# Patient Record
Sex: MALE | Race: WHITE | Employment: OTHER | URBAN - METROPOLITAN AREA
[De-identification: names, ages, dates, MRNs, and addresses within clinical notes are randomized per-mention and may not be internally consistent; named-entity substitution may affect disease eponyms.]

---

## 2022-01-25 ENCOUNTER — HOSPITAL ENCOUNTER (INPATIENT)
Age: 37
LOS: 5 days | Discharge: HOME | DRG: 885 | End: 2022-01-31
Attending: STUDENT IN AN ORGANIZED HEALTH CARE EDUCATION/TRAINING PROGRAM
Payer: MEDICARE

## 2022-01-25 DIAGNOSIS — R45.851 SUICIDAL IDEATION: Primary | ICD-10-CM

## 2022-01-25 PROCEDURE — 83036 HEMOGLOBIN GLYCOSYLATED A1C: CPT

## 2022-01-25 PROCEDURE — 80307 DRUG TEST PRSMV CHEM ANLYZR: CPT

## 2022-01-25 PROCEDURE — 80061 LIPID PANEL: CPT

## 2022-01-25 PROCEDURE — 85025 COMPLETE CBC W/AUTO DIFF WBC: CPT

## 2022-01-25 PROCEDURE — 84443 ASSAY THYROID STIM HORMONE: CPT

## 2022-01-25 PROCEDURE — 36415 COLL VENOUS BLD VENIPUNCTURE: CPT

## 2022-01-25 PROCEDURE — 99285 EMERGENCY DEPT VISIT HI MDM: CPT

## 2022-01-25 PROCEDURE — 81003 URINALYSIS AUTO W/O SCOPE: CPT

## 2022-01-25 PROCEDURE — 80053 COMPREHEN METABOLIC PANEL: CPT

## 2022-01-25 PROCEDURE — 80143 DRUG ASSAY ACETAMINOPHEN: CPT

## 2022-01-25 PROCEDURE — 82077 ASSAY SPEC XCP UR&BREATH IA: CPT

## 2022-01-25 PROCEDURE — 80179 DRUG ASSAY SALICYLATE: CPT

## 2022-01-25 ASSESSMENT — PAIN SCALES - GENERAL: PAINLEVEL_OUTOF10: 6

## 2022-01-25 ASSESSMENT — PAIN DESCRIPTION - LOCATION: LOCATION: TOE (COMMENT WHICH ONE)

## 2022-01-25 ASSESSMENT — PAIN DESCRIPTION - PAIN TYPE: TYPE: ACUTE PAIN

## 2022-01-25 ASSESSMENT — PAIN DESCRIPTION - ORIENTATION: ORIENTATION: RIGHT

## 2022-01-26 ENCOUNTER — APPOINTMENT (OUTPATIENT)
Dept: GENERAL RADIOLOGY | Age: 37
DRG: 885 | End: 2022-01-26
Payer: MEDICARE

## 2022-01-26 PROBLEM — F31.2 BIPOLAR DISORDER, CURRENT EPISODE MANIC SEVERE WITH PSYCHOTIC FEATURES (HCC): Status: ACTIVE | Noted: 2022-01-26

## 2022-01-26 PROBLEM — F60.2 PERSONALITY DISORDER WITH PREDOMINANTLY SOCIOPATHIC OR ASOCIAL MANIFESTATION (HCC): Status: ACTIVE | Noted: 2022-01-26

## 2022-01-26 LAB
A/G RATIO: 1.5 (ref 1.1–2.2)
ACETAMINOPHEN LEVEL: <5 UG/ML (ref 10–30)
ALBUMIN SERPL-MCNC: 4.4 G/DL (ref 3.4–5)
ALP BLD-CCNC: 96 U/L (ref 40–129)
ALT SERPL-CCNC: 26 U/L (ref 10–40)
AMPHETAMINE SCREEN, URINE: ABNORMAL
ANION GAP SERPL CALCULATED.3IONS-SCNC: 12 MMOL/L (ref 3–16)
AST SERPL-CCNC: 17 U/L (ref 15–37)
BARBITURATE SCREEN URINE: ABNORMAL
BASOPHILS ABSOLUTE: 0.1 K/UL (ref 0–0.2)
BASOPHILS RELATIVE PERCENT: 0.6 %
BENZODIAZEPINE SCREEN, URINE: ABNORMAL
BILIRUB SERPL-MCNC: <0.2 MG/DL (ref 0–1)
BILIRUBIN URINE: NEGATIVE
BLOOD, URINE: NEGATIVE
BUN BLDV-MCNC: 15 MG/DL (ref 7–20)
CALCIUM SERPL-MCNC: 9.5 MG/DL (ref 8.3–10.6)
CANNABINOID SCREEN URINE: POSITIVE
CHLORIDE BLD-SCNC: 104 MMOL/L (ref 99–110)
CLARITY: CLEAR
CO2: 20 MMOL/L (ref 21–32)
COCAINE METABOLITE SCREEN URINE: ABNORMAL
COLOR: YELLOW
CREAT SERPL-MCNC: 0.8 MG/DL (ref 0.9–1.3)
EOSINOPHILS ABSOLUTE: 0.3 K/UL (ref 0–0.6)
EOSINOPHILS RELATIVE PERCENT: 2.3 %
ETHANOL: NORMAL MG/DL (ref 0–0.08)
GFR AFRICAN AMERICAN: >60
GFR NON-AFRICAN AMERICAN: >60
GLUCOSE BLD-MCNC: 149 MG/DL (ref 70–99)
GLUCOSE URINE: NEGATIVE MG/DL
HCT VFR BLD CALC: 44.7 % (ref 40.5–52.5)
HEMOGLOBIN: 15.4 G/DL (ref 13.5–17.5)
INFLUENZA A: NOT DETECTED
INFLUENZA B: NOT DETECTED
KETONES, URINE: NEGATIVE MG/DL
LEUKOCYTE ESTERASE, URINE: NEGATIVE
LYMPHOCYTES ABSOLUTE: 4.2 K/UL (ref 1–5.1)
LYMPHOCYTES RELATIVE PERCENT: 35.3 %
Lab: ABNORMAL
MCH RBC QN AUTO: 32.5 PG (ref 26–34)
MCHC RBC AUTO-ENTMCNC: 34.4 G/DL (ref 31–36)
MCV RBC AUTO: 94.4 FL (ref 80–100)
METHADONE SCREEN, URINE: ABNORMAL
MICROSCOPIC EXAMINATION: NORMAL
MONOCYTES ABSOLUTE: 1.2 K/UL (ref 0–1.3)
MONOCYTES RELATIVE PERCENT: 10.2 %
NEUTROPHILS ABSOLUTE: 6.2 K/UL (ref 1.7–7.7)
NEUTROPHILS RELATIVE PERCENT: 51.6 %
NITRITE, URINE: NEGATIVE
OPIATE SCREEN URINE: ABNORMAL
OXYCODONE URINE: ABNORMAL
PDW BLD-RTO: 13.8 % (ref 12.4–15.4)
PH UA: 6
PH UA: 6 (ref 5–8)
PHENCYCLIDINE SCREEN URINE: ABNORMAL
PLATELET # BLD: 302 K/UL (ref 135–450)
PMV BLD AUTO: 8.4 FL (ref 5–10.5)
POTASSIUM SERPL-SCNC: 3.8 MMOL/L (ref 3.5–5.1)
PROPOXYPHENE SCREEN: ABNORMAL
PROTEIN UA: NEGATIVE MG/DL
RBC # BLD: 4.74 M/UL (ref 4.2–5.9)
SALICYLATE, SERUM: <0.3 MG/DL (ref 15–30)
SARS-COV-2 RNA, RT PCR: NOT DETECTED
SODIUM BLD-SCNC: 136 MMOL/L (ref 136–145)
SPECIFIC GRAVITY UA: >=1.03 (ref 1–1.03)
TOTAL PROTEIN: 7.3 G/DL (ref 6.4–8.2)
TSH SERPL DL<=0.05 MIU/L-ACNC: 2.21 UIU/ML (ref 0.27–4.2)
URINE TYPE: NORMAL
UROBILINOGEN, URINE: 0.2 E.U./DL
WBC # BLD: 11.9 K/UL (ref 4–11)

## 2022-01-26 PROCEDURE — 73660 X-RAY EXAM OF TOE(S): CPT

## 2022-01-26 PROCEDURE — 87636 SARSCOV2 & INF A&B AMP PRB: CPT

## 2022-01-26 PROCEDURE — 99223 1ST HOSP IP/OBS HIGH 75: CPT | Performed by: PSYCHIATRY & NEUROLOGY

## 2022-01-26 PROCEDURE — 1240000000 HC EMOTIONAL WELLNESS R&B

## 2022-01-26 PROCEDURE — 99221 1ST HOSP IP/OBS SF/LOW 40: CPT

## 2022-01-26 RX ORDER — HYDROXYZINE PAMOATE 50 MG/1
50 CAPSULE ORAL 3 TIMES DAILY PRN
Status: DISCONTINUED | OUTPATIENT
Start: 2022-01-26 | End: 2022-01-31 | Stop reason: HOSPADM

## 2022-01-26 RX ORDER — IBUPROFEN 400 MG/1
400 TABLET ORAL EVERY 6 HOURS PRN
Status: DISCONTINUED | OUTPATIENT
Start: 2022-01-26 | End: 2022-01-31 | Stop reason: HOSPADM

## 2022-01-26 RX ORDER — NICOTINE 21 MG/24HR
1 PATCH, TRANSDERMAL 24 HOURS TRANSDERMAL DAILY
Status: DISCONTINUED | OUTPATIENT
Start: 2022-01-26 | End: 2022-01-31 | Stop reason: HOSPADM

## 2022-01-26 RX ORDER — DIPHENHYDRAMINE HYDROCHLORIDE 50 MG/ML
50 INJECTION INTRAMUSCULAR; INTRAVENOUS EVERY 4 HOURS PRN
Status: DISCONTINUED | OUTPATIENT
Start: 2022-01-26 | End: 2022-01-31 | Stop reason: HOSPADM

## 2022-01-26 RX ORDER — POLYETHYLENE GLYCOL 3350 17 G
2 POWDER IN PACKET (EA) ORAL
Status: DISCONTINUED | OUTPATIENT
Start: 2022-01-26 | End: 2022-01-26 | Stop reason: SDUPTHER

## 2022-01-26 RX ORDER — ACETAMINOPHEN 325 MG/1
650 TABLET ORAL EVERY 4 HOURS PRN
Status: DISCONTINUED | OUTPATIENT
Start: 2022-01-26 | End: 2022-01-31 | Stop reason: HOSPADM

## 2022-01-26 RX ORDER — OLANZAPINE 10 MG/1
10 INJECTION, POWDER, LYOPHILIZED, FOR SOLUTION INTRAMUSCULAR EVERY 8 HOURS PRN
Status: DISCONTINUED | OUTPATIENT
Start: 2022-01-26 | End: 2022-01-31 | Stop reason: HOSPADM

## 2022-01-26 RX ORDER — OLANZAPINE 10 MG/1
10 TABLET ORAL EVERY 8 HOURS PRN
Status: DISCONTINUED | OUTPATIENT
Start: 2022-01-26 | End: 2022-01-31 | Stop reason: HOSPADM

## 2022-01-26 RX ORDER — LANOLIN ALCOHOL/MO/W.PET/CERES
3 CREAM (GRAM) TOPICAL NIGHTLY PRN
Status: DISCONTINUED | OUTPATIENT
Start: 2022-01-26 | End: 2022-01-31 | Stop reason: HOSPADM

## 2022-01-26 RX ORDER — MAGNESIUM HYDROXIDE/ALUMINUM HYDROXICE/SIMETHICONE 120; 1200; 1200 MG/30ML; MG/30ML; MG/30ML
30 SUSPENSION ORAL EVERY 6 HOURS PRN
Status: DISCONTINUED | OUTPATIENT
Start: 2022-01-26 | End: 2022-01-31 | Stop reason: HOSPADM

## 2022-01-26 SDOH — SOCIAL STABILITY: SOCIAL NETWORK
IN A TYPICAL WEEK, HOW MANY TIMES DO YOU TALK ON THE PHONE WITH FAMILY, FRIENDS, OR NEIGHBORS?: MORE THAN THREE TIMES A WEEK

## 2022-01-26 SDOH — SOCIAL STABILITY: SOCIAL NETWORK
DO YOU BELONG TO ANY CLUBS OR ORGANIZATIONS SUCH AS CHURCH GROUPS UNIONS, FRATERNAL OR ATHLETIC GROUPS, OR SCHOOL GROUPS?: NO

## 2022-01-26 SDOH — ECONOMIC STABILITY: TRANSPORTATION INSECURITY
IN THE PAST 12 MONTHS, HAS THE LACK OF TRANSPORTATION KEPT YOU FROM MEDICAL APPOINTMENTS OR FROM GETTING MEDICATIONS?: YES

## 2022-01-26 SDOH — HEALTH STABILITY: PHYSICAL HEALTH: ON AVERAGE, HOW MANY MINUTES DO YOU ENGAGE IN EXERCISE AT THIS LEVEL?: 60 MIN

## 2022-01-26 SDOH — ECONOMIC STABILITY: FOOD INSECURITY: WITHIN THE PAST 12 MONTHS, THE FOOD YOU BOUGHT JUST DIDN'T LAST AND YOU DIDN'T HAVE MONEY TO GET MORE.: OFTEN TRUE

## 2022-01-26 SDOH — HEALTH STABILITY: MENTAL HEALTH: HOW OFTEN DO YOU HAVE A DRINK CONTAINING ALCOHOL?: MONTHLY OR LESS

## 2022-01-26 SDOH — SOCIAL STABILITY: SOCIAL INSECURITY: WITHIN THE LAST YEAR, HAVE YOU BEEN HUMILIATED OR EMOTIONALLY ABUSED IN OTHER WAYS BY YOUR PARTNER OR EX-PARTNER?: YES

## 2022-01-26 SDOH — ECONOMIC STABILITY: FOOD INSECURITY: WITHIN THE PAST 12 MONTHS, YOU WORRIED THAT YOUR FOOD WOULD RUN OUT BEFORE YOU GOT MONEY TO BUY MORE.: OFTEN TRUE

## 2022-01-26 SDOH — ECONOMIC STABILITY: TRANSPORTATION INSECURITY
IN THE PAST 12 MONTHS, HAS LACK OF TRANSPORTATION KEPT YOU FROM MEETINGS, WORK, OR FROM GETTING THINGS NEEDED FOR DAILY LIVING?: YES

## 2022-01-26 SDOH — SOCIAL STABILITY: SOCIAL NETWORK: HOW OFTEN DO YOU ATTEND CHURCH OR RELIGIOUS SERVICES?: NEVER

## 2022-01-26 SDOH — SOCIAL STABILITY: SOCIAL INSECURITY: WITHIN THE LAST YEAR, HAVE YOU BEEN AFRAID OF YOUR PARTNER OR EX-PARTNER?: NO

## 2022-01-26 SDOH — SOCIAL STABILITY: SOCIAL INSECURITY
WITHIN THE LAST YEAR, HAVE YOU BEEN KICKED, HIT, SLAPPED, OR OTHERWISE PHYSICALLY HURT BY YOUR PARTNER OR EX-PARTNER?: YES

## 2022-01-26 SDOH — HEALTH STABILITY: MENTAL HEALTH: HOW MANY STANDARD DRINKS CONTAINING ALCOHOL DO YOU HAVE ON A TYPICAL DAY?: 1 OR 2

## 2022-01-26 SDOH — HEALTH STABILITY: MENTAL HEALTH
STRESS IS WHEN SOMEONE FEELS TENSE, NERVOUS, ANXIOUS, OR CAN'T SLEEP AT NIGHT BECAUSE THEIR MIND IS TROUBLED. HOW STRESSED ARE YOU?: VERY MUCH

## 2022-01-26 SDOH — SOCIAL STABILITY: SOCIAL INSECURITY
WITHIN THE LAST YEAR, HAVE TO BEEN RAPED OR FORCED TO HAVE ANY KIND OF SEXUAL ACTIVITY BY YOUR PARTNER OR EX-PARTNER?: NO

## 2022-01-26 SDOH — SOCIAL STABILITY: SOCIAL NETWORK: HOW OFTEN DO YOU GET TOGETHER WITH FRIENDS OR RELATIVES?: MORE THAN THREE TIMES A WEEK

## 2022-01-26 SDOH — ECONOMIC STABILITY: HOUSING INSECURITY
IN THE LAST 12 MONTHS, WAS THERE A TIME WHEN YOU DID NOT HAVE A STEADY PLACE TO SLEEP OR SLEPT IN A SHELTER (INCLUDING NOW)?: YES

## 2022-01-26 SDOH — ECONOMIC STABILITY: HOUSING INSECURITY: IN THE LAST 12 MONTHS, HOW MANY PLACES HAVE YOU LIVED?: 2

## 2022-01-26 SDOH — ECONOMIC STABILITY: INCOME INSECURITY: IN THE LAST 12 MONTHS, WAS THERE A TIME WHEN YOU WERE NOT ABLE TO PAY THE MORTGAGE OR RENT ON TIME?: NO

## 2022-01-26 SDOH — SOCIAL STABILITY: SOCIAL NETWORK: HOW OFTEN DO YOU ATTENT MEETINGS OF THE CLUB OR ORGANIZATION YOU BELONG TO?: NEVER

## 2022-01-26 SDOH — HEALTH STABILITY: PHYSICAL HEALTH: ON AVERAGE, HOW MANY DAYS PER WEEK DO YOU ENGAGE IN MODERATE TO STRENUOUS EXERCISE (LIKE A BRISK WALK)?: 3 DAYS

## 2022-01-26 SDOH — ECONOMIC STABILITY: INCOME INSECURITY: HOW HARD IS IT FOR YOU TO PAY FOR THE VERY BASICS LIKE FOOD, HOUSING, MEDICAL CARE, AND HEATING?: VERY HARD

## 2022-01-26 ASSESSMENT — SLEEP AND FATIGUE QUESTIONNAIRES
SLEEP PATTERN: DIFFICULTY FALLING ASLEEP;RESTLESSNESS;DISTURBED/INTERRUPTED SLEEP;EARLY AWAKENING
AVERAGE NUMBER OF SLEEP HOURS: 4
DIFFICULTY FALLING ASLEEP: YES
DO YOU HAVE DIFFICULTY SLEEPING: YES
DIFFICULTY FALLING ASLEEP: YES
DO YOU HAVE DIFFICULTY SLEEPING: YES
DIFFICULTY ARISING: NO
RESTFUL SLEEP: NO
DIFFICULTY STAYING ASLEEP: YES
DIFFICULTY STAYING ASLEEP: YES
AVERAGE NUMBER OF SLEEP HOURS: 4
DO YOU USE A SLEEP AID: NO
DIFFICULTY ARISING: NO
DO YOU USE A SLEEP AID: NO
RESTFUL SLEEP: NO

## 2022-01-26 ASSESSMENT — PATIENT HEALTH QUESTIONNAIRE - PHQ9
SUM OF ALL RESPONSES TO PHQ QUESTIONS 1-9: 7
SUM OF ALL RESPONSES TO PHQ QUESTIONS 1-9: 7

## 2022-01-26 ASSESSMENT — LIFESTYLE VARIABLES
HISTORY_ALCOHOL_USE: YES
HISTORY_ALCOHOL_USE: YES

## 2022-01-26 NOTE — ED NOTES
Pt brought back to [facility], already changed into safety gown. Pt oriented to room B4 and BC process. Pt's belongings placed in locker.          Yolande ROOT

## 2022-01-26 NOTE — ED NOTES
Patient resting in bed, snack and beverage provided, patient expresses no other needs at this time. Will continue to monitor.      Guy Arellano RN  01/26/22 0004

## 2022-01-26 NOTE — BH NOTE
Tahir Taylor called back for collateral, he stated Oniel's personality has changed recently since being up in [place], he is unsure as to why but disclosed Dominic Riddle has recently  from significant other and his step-father passed away. Cher Zarate discussed how Dominic Riddle is typically \"a good arsenio\" but seems to be struggling to get back on his feet after the break up. He also stated Davey Naheed has a good support system in the [place] area, but Davey Farfan needs to start making changes in the right direction charly stated \"you can lead a horse to water, but you cannot make him drink\" referring to oniel's situation since he seems to not want help and giving up.

## 2022-01-26 NOTE — PROGRESS NOTES
about quitting (benefits of quitting, techniques in how to quit, available resources  ( ) Referral for counseling faxed to Alex                                           ( ) Patient refused counseling  ( ) Patient has not smoked in the last 30 days    Metabolic Screening:    No results found for: LABA1C    No results found for: CHOL  No results found for: TRIG  No results found for: HDL  No components found for: LDLCAL  No results found for: LABVLDL      Body mass index is 27.12 kg/m². BP Readings from Last 2 Encounters:   01/26/22 121/89           Pt admitted with followings belongings: Belongings not checked at this time. Patient's home medications were none, per patient. Patient oriented to surroundings and program expectations and copy of patient rights given. Received admission packet:  Yes. Consents reviewed, signed Yes. . Patient verbalize understanding:  Yes. Patient education on precautions: Yes.                    Krista Obrien RN

## 2022-01-26 NOTE — PROGRESS NOTES
Comprehensive Nutrition Assessment    Type and Reason for Visit:  Initial,Positive Nutrition Screen (+ screen for MST = 2)    Nutrition Recommendations/Plan:   1. Continue ADULT DIET; Regular diet order - please document meal intake % in flow sheets for each meal.   2. Monitor appetite and po intake. 3. Please obtain an actual, current weight for this patient - only a stated weight was obtained upon admission. Nutrition Assessment:  patient is nutritionally compromised AEB homelessness PTA and mental health decline and patient is at risk for further nutritional compromise d/t ongoing mental health compromise and lack of or limited access to food + money + social support, etc. outside of the hospital; will continue ADULT DIET; Regular diet order and monitor nutrition status    Malnutrition Assessment:  Malnutrition Status: At risk for malnutrition    Context:  Social/Environmental Circumstances     Findings of the 6 clinical characteristics of malnutrition:  Energy Intake:  Mild decrease in energy intake (decreased po intake PTA - unspecified)  Weight Loss:  Unable to assess (only a stated weight obtained upon admission)     Body Fat Loss:  Unable to assess     Muscle Mass Loss:  Unable to assess    Fluid Accumulation:  No significant fluid accumulation     Strength:  Not Performed    Estimated Daily Nutrient Needs:  Energy (kcal):  1820 - 2093 kcals based on 20-23 kcals/kg/CBW; Weight Used for Energy Requirements:  Current (based on stated weight of 200#)     Protein (g):  97 - 105 g protein based on 1.2-1.3 g/kg/IBW;  Weight Used for Protein Requirements:  Ideal        Fluid (ml/day):  1820 - 2093 ml; Method Used for Fluid Requirements:  1 ml/kcal      Nutrition Related Findings:  patient is A & O; patient was homeless and living with a friend PTA; patient reported that he has no identification or money because of issues that he was having PTA; patient was reportedly in an in-patient psych unit PTA for 2 weeks, per patient      Wounds:  None       Current Nutrition Therapies:    ADULT DIET; Regular    Anthropometric Measures:  · Height: 6' (182.9 cm)  · Current Body Weight: 200 lb (90.7 kg) (obtained on 1/26/22; stated weight)   · Admission Body Weight: 200 lb (90.7 kg) (obtained on 1/26/22; stated weight)    · Usual Body Weight:  (unknown; lack of weight hx in EMR)     · Ideal Body Weight: 178 lbs; % Ideal Body Weight 112.4 %   · BMI: 27.1  · BMI Categories: Overweight (BMI 25.0-29. 9)       Nutrition Diagnosis:   · Inadequate oral intake related to psychological cause or life stress,inadequate protein-energy intake,lack or limited access to food as evidenced by poor intake prior to admission,other (comment) (homelessness PTA; mental health compromise)      Nutrition Interventions:   Food and/or Nutrient Delivery:  Continue Current Diet  Nutrition Education/Counseling:  No recommendation at this time   Coordination of Nutrition Care:  Continue to monitor while inpatient,Coordination of Community Care    Goals:  patient will consume 75% or greater of meals on ADULT DIET;  Regular diet order x 3 meals per day       Nutrition Monitoring and Evaluation:   Behavioral-Environmental Outcomes:  None Identified   Food/Nutrient Intake Outcomes:  Food and Nutrient Intake  Physical Signs/Symptoms Outcomes:  Meal Time Behavior,Nutrition Focused Physical Findings,Skin,Weight     Discharge Planning:    Continue current diet,Assist with food insecurity     Electronically signed by Aissatou Munroe RD, LD on 1/26/22 at 4:51 PM EST    Contact: [phone]
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
## 2022-01-26 NOTE — PROGRESS NOTES
Behavioral Services  Medicare Certification Upon Admission    I certify that this patient's inpatient psychiatric hospital admission is medically necessary for:    [x] (1) Treatment which could reasonably be expected to improve this patient's condition,       [x] (2) Or for diagnostic study;     AND     [x](2) The inpatient psychiatric services are provided while the individual is under the care of a physician and are included in the individualized plan of care.     Estimated length of stay/service 3-5 d    Plan for post-hospital care outpt    Electronically signed by Carlos Foy MD on 1/26/2022 at 12:59 PM

## 2022-01-26 NOTE — PROGRESS NOTES
Patient reported having suicidal ideation, but was able to contract for safety. Suicide precautions, were discontinued & Dr Wilian Michaels was aware.  Nathaniel Kowalski R.N.
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
## 2022-01-26 NOTE — GROUP NOTE
Group Therapy Note    Date: 1/26/2022    Group Start Time: 1000  Group End Time: 3642  Group Topic: Psychoeducation    Kaveh Sun 30, MSW        Group Therapy Note    Clinician introduce the feelings wheel to help the patients identify the correct feelings they were currently having. The clinician also introduced the Zones of regulations to help the patients identify if they were in the red, yellow, green or red zone and discussed how to apply the correct coping skills to each zone.      Attendees: 8         Patient's Goal:      Notes:      Status After Intervention:  Improved    Participation Level: Monopolizing    Participation Quality: Intrusive      Speech:  pressured      Thought Process/Content: Linear      Affective Functioning: Congruent      Mood: elevated      Level of consciousness:  Attentive      Response to Learning: Able to retain information      Endings: None Reported    Modes of Intervention: Education      Discipline Responsible: /Counselor      Signature:  RADHA Gottlieb
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
## 2022-01-26 NOTE — CARE COORDINATION
[facility]  Treatment Team Note  Day 1    Review Date & Time: 0900  1/26/2022    Patient was not in treatment team      Status EXAM:   Status and Exam  Normal: No  Facial Expression: Flat,Worried  Affect: Blunt  Level of Consciousness: Alert  Mood:Normal: No  Mood: Depressed,Anxious  Motor Activity:Normal: Yes  Interview Behavior: Cooperative  Preception: Bradford to Person,Bradford to Time,Bradford to Place,[place]ford to Situation  Attention:Normal: Yes  Attention:  (WNL)  Thought Processes:  (Linear)  Thought Content:Normal: Yes  Hallucinations: None  Delusions: No  Memory:Normal: Yes  Insight and Judgment: No  Insight and Judgment: Poor Judgment,Poor Insight  Present Suicidal Ideation: Yes (Patient was able to contract for safety)  Present Homicidal Ideation: No      Suicide Risk CSSR-S:  1) Within the past month, have you wished you were dead or wished you could go to sleep and not wake up? : Yes  2) Have you actually had any thoughts of killing yourself? : Yes  3) Have you been thinking about how you might kill yourself? : No  5) Have you started to work out or worked out the details of how to kill yourself? Do you intend to carry out this plan? : No  6) Have you ever done anything, started to do anything, or prepared to do anything to end your life?: No      PLAN/TREATMENT RECOMMENDATIONS UPDATE: Patient will take medication as prescribed, eat 75% of meals, attend groups, participate in milieu activities, participate in treatment team and care planning for discharge and follow up. Patient is a Voluntary admission.      Jenna Antoine RN

## 2022-01-26 NOTE — GROUP NOTE
Group Therapy Note    Date: 1/26/2022    Group Start Time: 1100  Group End Time: 1783  Group Topic: Cognitive Skills    [address]        Group Therapy Note    Attendees: Group members were asked to identify 3 triggers, how to avoid or reduce their exposure to the triggers, and their strategy for dealing with each trigger head on when they can't be avoided. Notes:  Valeria Files attended group for the full duration. Valeria Files stood the whole time and remained appropriate throughout the full duration of the group. Status After Intervention:  Improved    Participation Level:  Active Listener and Interactive    Participation Quality: Appropriate      Speech:  pressured      Thought Process/Content: Flight of Ideas     Affective Functioning: Exaggerated      Mood: anxious      Level of consciousness:  Alert      Response to Learning: Able to verbalize current knowledge/experience      Endings: None Reported    Modes of Intervention: Exploration and Problem-solving      Discipline Responsible: Behavorial Health Tech      Signature:  Edda Simmonds, MSW

## 2022-01-26 NOTE — ED NOTES
Writer attempted to call pt's friend Juliet Sancheztner for collateral information. Juliet Destiny answered the phone. Fior Nieto became verbally aggressive with writer due to late hour of phone call. Writer apologized. Fior Nieto refused to answer any questions and phone call was disconnected from unknown cause. Attempted to call Fior Nieto again. Message was left with return phone number to [facility] when he did not answer.        Scottie HAYES

## 2022-01-26 NOTE — PROGRESS NOTES
Patient arrived on the unit at 05:15, via wheelchair, from this [facility]'s emergency room accompanied security & 1 [facility] staff. Patient was pleasant & cooperative & greeted by Orestes Gregory.  Nicole Kowalski R.N.

## 2022-01-26 NOTE — ED NOTES
Verbal report given to Marcia ORELLANA in the [facility] AN [facility]IL[facility] OF [facility], placed in safety gown, belongs taken with patient to the [facility]     Guy Arellano RN  01/26/22 1130

## 2022-01-26 NOTE — GROUP NOTE
Group Therapy Note    Date: 1/26/2022    Group Start Time: 1300  Group End Time: 1400  Group Topic: [address] [facility], [facility]        Group Therapy Note    Group members engaged in creative expression intervention, utilizing 100 song lyrics to create blackout poetry. Group facilitator provided education on blackout poetry hx, theories of practice, provided instruction on creative intervention. Facilitator provided environmental music to support intervention, allowed patients to work in silence. Group concluded with process of intervention, purpose and outcome of engagement. Attendees: 6         Patient's Goal:  Patient will utilize blackout poetry for self-expression, process with peers and facilitator when provided opportunity. Notes:  Pt was present and actively engaged in creative intervention across time allotted, processed individually with facilitator before exiting room. Group members set goal of continuing to utilize creative expression in down-time in milieu. Status After Intervention:  Improved    Participation Level:  Active Listener    Participation Quality: Appropriate and Attentive      Speech:  normal      Thought Process/Content: Logical      Affective Functioning: Congruent      Mood: euthymic      Level of consciousness:  Alert and Oriented x4      Response to Learning: Progressing to goal      Endings: None Reported    Modes of Intervention: Socialization, Exploration, Activity and Media      Discipline Responsible: Psychoeducational Specialist      Signature:  Miki Short, MM, MT-BC
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
## 2022-01-27 LAB
CHOLESTEROL, TOTAL: 221 MG/DL (ref 0–199)
HDLC SERPL-MCNC: 42 MG/DL (ref 40–60)
LDL CHOLESTEROL CALCULATED: 142 MG/DL
TRIGL SERPL-MCNC: 187 MG/DL (ref 0–150)
VLDLC SERPL CALC-MCNC: 37 MG/DL

## 2022-01-27 PROCEDURE — 99233 SBSQ HOSP IP/OBS HIGH 50: CPT | Performed by: PSYCHIATRY & NEUROLOGY

## 2022-01-27 PROCEDURE — 1240000000 HC EMOTIONAL WELLNESS R&B

## 2022-01-27 PROCEDURE — 6370000000 HC RX 637 (ALT 250 FOR IP): Performed by: PSYCHIATRY & NEUROLOGY

## 2022-01-27 RX ADMIN — NICOTINE POLACRILEX 2 MG: 2 GUM, CHEWING BUCCAL at 11:57

## 2022-01-27 RX ADMIN — IBUPROFEN 400 MG: 400 TABLET, FILM COATED ORAL at 01:54

## 2022-01-27 RX ADMIN — NICOTINE POLACRILEX 2 MG: 2 GUM, CHEWING BUCCAL at 16:28

## 2022-01-27 RX ADMIN — NICOTINE POLACRILEX 2 MG: 2 GUM, CHEWING BUCCAL at 23:10

## 2022-01-27 RX ADMIN — NICOTINE POLACRILEX 2 MG: 2 GUM, CHEWING BUCCAL at 14:12

## 2022-01-27 RX ADMIN — NICOTINE POLACRILEX 2 MG: 2 GUM, CHEWING BUCCAL at 19:04

## 2022-01-27 RX ADMIN — NICOTINE POLACRILEX 2 MG: 2 GUM, CHEWING BUCCAL at 08:42

## 2022-01-27 ASSESSMENT — PAIN SCALES - GENERAL: PAINLEVEL_OUTOF10: 7
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
## 2022-01-27 NOTE — PLAN OF CARE
Patient alert and oriented x 3. Patient rates Depression 9/10 and Anxiety 7/10. Patient denies SI/HI/A/V/H. Patient visible but keeps to himself. Patient attended and participated in wrap up group. Patient doesn't have HS medications scheduled. Patient denies self harm. No c/o's voiced at present.

## 2022-01-27 NOTE — BH NOTE
Purposeful Rounding    Patient Location: Day room    Patient willing to engage in conversation: Yes    Presentation/behavior: Attention Seeking    Affect: Inappropriate/Incongruent    Concerns reported: None    PRN medications given: N/A    Environmental assessment: No safety hazards noted    Fall prevention interventions in place: Lighting appropriate, Room free of clutter and Clear path to bathroom    Daily [place] Fall Risk Score: 55    Daily Rojas Fall Risk Score: 0

## 2022-01-27 NOTE — FLOWSHEET NOTE
Purposeful Rounding    Patient Location: Patient room    Patient willing to engage in conversation: No    Presentation/behavior: Patient a sleep    Affect: Patient a sleep    Concerns reported: None    PRN medications given: None    Environmental assessment: No safety hazards noted    Fall prevention interventions in place: Yellow non-skid socks on    Daily [place] Fall Risk Score: 47    Daily Rojas Fall Risk Score: Low risk      Electronically signed by Pascual Valentine RN on 1/27/22 at 12:23 AM EST
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
## 2022-01-27 NOTE — FLOWSHEET NOTE
Group Therapy Note    Date: 1/27/2022  Start Time: 1300  End Time:  1400  Number of Participants: 8    Type of Group: Music Group    Notes:  Pt present for Music Group. Pt actively participated by making song selections and singing along to music. Participation Level:  Active Listener and Interactive    Participation Quality: Appropriate and Attentive      Speech:  normal      Affective Functioning: Congruent      Endings: None Reported    Modes of Intervention: Support, Socialization, Activity and Media      Discipline Responsible: Chaplain Ziyad Cochran       01/27/22 4782   Encounter Summary   Services provided to: Patient   Continue Visiting   (1/27 Music Group)   Complexity of Encounter Moderate   Length of Encounter 1 hour

## 2022-01-27 NOTE — FLOWSHEET NOTE
Purposeful Rounding    Patient Location: Day room    Patient willing to engage in conversation: Yes    Presentation/behavior: Pleasant and Assertive    Affect: Brightens with interaction    Concerns reported: None    PRN medications given: None    Environmental assessment: No safety hazards noted    Fall prevention interventions in place: Yellow non-skid socks on    Daily [place] Fall Risk Score: Low risk    Daily Rojas Fall Risk Score: 52      Electronically signed by Liya Espinoza RN on 1/26/22 at 8:34 PM EST

## 2022-01-27 NOTE — GROUP NOTE
Group Therapy Note    Date: 1/27/2022    Group Start Time: 1000  Group End Time: 0869  Group Topic: [address] [facility] Lilly, MSW        Group Therapy Note    Topic: Stress & The Body, understanding the signs, reframing the experience    Mode of Intervention: Aggie Analysis, Creative Art, Psychoeducation    Group facilitator led discussion of members' personally experienced signs/symptoms of stress in their thoughts and body. Facilitator presented live music stimuli using song \"In Repair\" by Elmer Don, processed lyrics with group members and related topics to their lived experiences. Group members created a body outline for use in creative art intervention, illustrating visual representation of stress and it's effect on the body. Attendees: 7         Patient's Goal:  Patient will explore signs/symptoms of their stress, engage in cognitive reframing to process signs/symptoms as body's protective instincts. Patient will collaborate with peers in reflective process of song lyrics relating topic to self, collaborate to create visual art interpreting personally-experienced s/s of stress in the body. Notes:  Patient present across group interventions, monopolozing and disruptive throughout with poor response to redirection. Patient consistently distracting peers from proper engagement, seeking emotional response while discussing \"drug addicts\" as \"weak-minded losers\".     Status After Intervention:  Unchanged    Participation Level: Monopolizing    Participation Quality: Inappropriate and Intrusive      Speech:  loud      Thought Process/Content: Flight of ideas      Affective Functioning: Exaggerated      Mood: anxious, elevated      Level of consciousness:  Preoccupied      Response to Learning: Resistant      Endings: None Reported    Modes of Intervention: Education, Support, Exploration, Activity and Media      Discipline Responsible: Psychoeducational Specialist      Signature:  Chase Fang MM, MT-BC

## 2022-01-27 NOTE — GROUP NOTE
Group Therapy Note    Date: 1/26/2022    Group Start Time: 2030  Group End Time: 2050  Group Topic: Wrap-Up    [address] [place], RN        Group Therapy Note    Attendees: 6    Daily goals and coping skills. Patient's Goal:  To be more social and to attend groups    Notes:  Milo Fang stated that he attended all of today's groups and he feels like he was social with peers    Status After Intervention:  Improved    Participation Level:  Active Listener and Interactive    Participation Quality: Appropriate, Attentive and Sharing      Speech:  normal      Thought Process/Content: Logical  Linear      Affective Functioning: Congruent      Mood: euthymic      Level of consciousness:  Alert      Response to Learning: Able to verbalize current knowledge/experience      Endings: None Reported    Modes of Intervention: Education, Support and Socialization      Discipline Responsible: Registered Nurse      Signature:  Kelsy Chambers RN

## 2022-01-27 NOTE — PROGRESS NOTES
Department of Psychiatry  AttendingProgress Note  Chief Complaint: depression  Manpreet Banuelos slept better last night. He remains hopeless about the future, natasha has no options in life , as he feels that all people in his life have taken advantage of him. He is at risk of self harm due to feeling that life will never improve. He is more logical but continues to show limited insight into his illness. He remains labile and focused on people making his life difficult. I discussed LAST as he is typically non compliant with po meds and he refused to agree to [facility] [place]. He has been on various meds over the years and doesn't want to take anything. Appears paranoid about taking medication. He is attending groups. He is also focused on getting his ID and SS card. Will continue to encourage medication compliance and LAST [place]        Patient's chart was reviewed and collaborated with  about the treatment plan. SUBJECTIVE:    Patient is feeling better. Suicidal ideation:  passive. Patient does not have medication side effects. ROS: Patient has new complaints: no  Sleeping adequately:  Yes   Appetite adequate: Yes  Attending groups: Yes  Visitors:No    OBJECTIVE    Physical  VITALS:  /79   Pulse 81   Temp 96.2 °F (35.7 °C) (Temporal)   Resp 16   Ht 6' (1.829 m)   Wt 200 lb (90.7 kg)   SpO2 96%   BMI 27.12 kg/m²     Mental Status Examination:  Patients appearance was ill-appearing. Thoughts are Goal directed. Homicidal ideations none. No abnormal movements, tics or mannerisms. Memory intact Aims 0. Concentration Fair. Alert and oriented X 4. Insight and Judgement impaired insight. Patient was cooperative.  Patient gait normal. Mood decreased range and depressed, affect depressed affect Hallucinations Absent, suicidal ideations no specific plan to harm self Speech normal volume  Data  Labs:   Admission on 01/25/2022   Component Date Value Ref Range Status    WBC 01/25/2022 11.9* 4.0 - 11.0 K/uL Final  RBC 01/25/2022 4.74  4.20 - 5.90 M/uL Final    Hemoglobin 01/25/2022 15.4  13.5 - 17.5 g/dL Final    Hematocrit 01/25/2022 44.7  40.5 - 52.5 % Final    MCV 01/25/2022 94.4  80.0 - 100.0 fL Final    MCH 01/25/2022 32.5  26.0 - 34.0 pg Final    MCHC 01/25/2022 34.4  31.0 - 36.0 g/dL Final    RDW 01/25/2022 13.8  12.4 - 15.4 % Final    Platelets 62/48/9096 302  135 - 450 K/uL Final    MPV 01/25/2022 8.4  5.0 - 10.5 fL Final    Neutrophils % 01/25/2022 51.6  % Final    Lymphocytes % 01/25/2022 35.3  % Final    Monocytes % 01/25/2022 10.2  % Final    Eosinophils % 01/25/2022 2.3  % Final    Basophils % 01/25/2022 0.6  % Final    Neutrophils Absolute 01/25/2022 6.2  1.7 - 7.7 K/uL Final    Lymphocytes Absolute 01/25/2022 4.2  1.0 - 5.1 K/uL Final    Monocytes Absolute 01/25/2022 1.2  0.0 - 1.3 K/uL Final    Eosinophils Absolute 01/25/2022 0.3  0.0 - 0.6 K/uL Final    Basophils Absolute 01/25/2022 0.1  0.0 - 0.2 K/uL Final    Sodium 01/25/2022 136  136 - 145 mmol/L Final    Potassium 01/25/2022 3.8  3.5 - 5.1 mmol/L Final    Chloride 01/25/2022 104  99 - 110 mmol/L Final    CO2 01/25/2022 20* 21 - 32 mmol/L Final    Anion Gap 01/25/2022 12  3 - 16 Final    Glucose 01/25/2022 149* 70 - 99 mg/dL Final    BUN 01/25/2022 15  7 - 20 mg/dL Final    CREATININE 01/25/2022 0.8* 0.9 - 1.3 mg/dL Final    GFR Non- 01/25/2022 >60  >60 Final    Comment: >60 mL/min/1.73m2 EGFR, calc. for ages 25 and older using the  MDRD formula (not corrected for weight), is valid for stable  renal function.  GFR  01/25/2022 >60  >60 Final    Comment: Chronic Kidney Disease: less than 60 ml/min/1.73 sq.m. Kidney Failure: less than 15 ml/min/1.73 sq.m. Results valid for patients 18 years and older.       Calcium 01/25/2022 9.5  8.3 - 10.6 mg/dL Final    Total Protein 01/25/2022 7.3  6.4 - 8.2 g/dL Final    Albumin 01/25/2022 4.4  3.4 - 5.0 g/dL Final    Albumin/Globulin Ratio 01/25/2022 1.5  1.1 - 2.2 Final    Total Bilirubin 01/25/2022 <0.2  0.0 - 1.0 mg/dL Final    Alkaline Phosphatase 01/25/2022 96  40 - 129 U/L Final    ALT 01/25/2022 26  10 - 40 U/L Final    AST 01/25/2022 17  15 - 37 U/L Final    Amphetamine Screen, Urine 01/25/2022 Neg  Negative <1000ng/mL Final    Barbiturate Screen, Ur 01/25/2022 Neg  Negative <200 ng/mL Final    Benzodiazepine Screen, Urine 01/25/2022 Neg  Negative <200 ng/mL Final    Cannabinoid Scrn, Ur 01/25/2022 POSITIVE* Negative <50 ng/mL Final    Cocaine Metabolite Screen, Urine 01/25/2022 Neg  Negative <300 ng/mL Final    Opiate Scrn, Ur 01/25/2022 Neg  Negative <300 ng/mL Final    Comment: \"Therapeutic levels of pain medication, especially oxycontin and synthetic  opioids, may not be detected by this Methodology. Pain management screen  panel  Drug panel-PM-Hi Res Ur, Interp (PAIN) should be considered for drug  monitoring \".  PCP Screen, Urine 01/25/2022 Neg  Negative <25 ng/mL Final    Methadone Screen, Urine 01/25/2022 Neg  Negative <300 ng/mL Final    Propoxyphene Scrn, Ur 01/25/2022 Neg  Negative <300 ng/mL Final    Oxycodone Urine 01/25/2022 Neg  Negative <100 ng/ml Final    pH, UA 01/25/2022 6.0   Final    Comment: Urine pH less than 5.0 or greater than 8.0 may indicate sample adulteration. Another sample should be collected if clinically  indicated.  Drug Screen Comment: 01/25/2022 see below   Final    Comment: This method is a screening test to detect only these drug  classes as part of a medical workup. Confirmatory testing  by another method should be ordered if clinically indicated.       Ethanol Lvl 01/25/2022 None Detected  mg/dL Final    Comment:    None Detected  Conversion factor:  100 mg/dl = .100 g/dl  For Medical Purposes Only      Color, UA 01/25/2022 Yellow  Straw/Yellow Final    Clarity, UA 01/25/2022 Clear  Clear Final    Glucose, Ur 01/25/2022 Negative  Negative mg/dL Final    Bilirubin Urine 01/25/2022 Negative  Negative Final    Ketones, Urine 01/25/2022 Negative  Negative mg/dL Final    Specific Gravity, UA 01/25/2022 >=1.030  1.005 - 1.030 Final    Blood, Urine 01/25/2022 Negative  Negative Final    pH, UA 01/25/2022 6.0  5.0 - 8.0 Final    Protein, UA 01/25/2022 Negative  Negative mg/dL Final    Urobilinogen, Urine 01/25/2022 0.2  <2.0 E.U./dL Final    Nitrite, Urine 01/25/2022 Negative  Negative Final    Leukocyte Esterase, Urine 01/25/2022 Negative  Negative Final    Microscopic Examination 01/25/2022 Not Indicated   Final    Urine Type 01/25/2022 NotGiven   Final    Urine received in a container without preservatives.  Acetaminophen Level 01/25/2022 <5* 10 - 30 ug/mL Final    Comment: Therapeutic Range: 10.0-30.0 ug/mL  Toxic: >=734 ug/mL      Salicylate, Serum 51/50/9118 <0.3* 15.0 - 30.0 mg/dL Final    Comment: Therapeutic Range: 15.0-30.0 mg/dL  Toxic: >30.0 mg/dL      SARS-CoV-2 RNA, RT PCR 01/26/2022 NOT DETECTED  NOT DETECTED Final    Comment: Not Detected results do not preclude SARS-CoV-2 infection and  should not be used as the sole basis for patient management  decisions. Results must be combined with clinical observations,  patient history, and epidemiological information. Testing was performed using L[employer] [facility] LUIS SARS-CoV-2 and Influenza A/B  nucleic acid assay. This test is a multiplex Real-Time Reverse  Transcriptase Polymerase Chain Reaction (RT-PCR)-based in vitro  diagnostic test intended for the qualitative detection of nucleic  acids from SARS-CoV-2, influenza A, and influenza B in nasopharyngeal  and nasal swab specimens for use under the FDAs Emergency Use  Authorization (EUA) only.     Patient Fact Sheet:  FindDrives.pl  Provider Fact Sheet: FindDrives.pl  EUA: FindDrives.pl  IFU: FindDrives.pl    Methodology:  RT-PCR      INFLUENZA A 01/26/2022 NOT DETECTED  NOT DETECTED Final    INFLUENZA B 01/26/2022 NOT DETECTED  NOT DETECTED Final    TSH 01/25/2022 2.21  0.27 - 4.20 uIU/mL Final            Medications  Current Facility-Administered Medications: acetaminophen (TYLENOL) tablet 650 mg, 650 mg, Oral, Q4H PRN  ibuprofen (ADVIL;MOTRIN) tablet 400 mg, 400 mg, Oral, Q6H PRN  magnesium hydroxide (MILK OF MAGNESIA) 400 MG/5ML suspension 30 mL, 30 mL, Oral, Daily PRN  aluminum & magnesium hydroxide-simethicone (MAALOX) 200-200-20 MG/5ML suspension 30 mL, 30 mL, Oral, Q6H PRN  hydrOXYzine (VISTARIL) capsule 50 mg, 50 mg, Oral, TID PRN  OLANZapine (ZYPREXA) tablet 10 mg, 10 mg, Oral, Q8H PRN **OR** OLANZapine (ZYPREXA) injection 10 mg, 10 mg, IntraMUSCular, Q8H PRN  sterile water injection 2.1 mL, 2.1 mL, IntraMUSCular, Q8H PRN  diphenhydrAMINE (BENADRYL) injection 50 mg, 50 mg, IntraMUSCular, Q4H PRN  melatonin tablet 3 mg, 3 mg, Oral, Nightly PRN  nicotine (NICODERM CQ) 14 MG/24HR 1 patch, 1 patch, TransDERmal, Daily  nicotine polacrilex (NICORETTE) gum 2 mg, 2 mg, Oral, Q2H PRN    ASSESSMENT AND PLAN    Principal Problem:    Bipolar disorder, current episode manic severe with psychotic features ([facility] [address]  Active Problems:    Injury of right great toe    Tobacco dependence    Homelessness    Encounter for routine adult medical examination    Personality disorder with predominantly sociopathic or asocial manifestation ([facility] [address]    Suicidal ideation  Resolved Problems:    * No resolved hospital problems. *       1. Patient s symptoms   are improving  2. Probable discharge is next week  3. Discharge planning is incomplete  4. Suicidal ideation is better  5. Total time with patient was 40 minutes and more than 50 % of that time was spent counseling the patient on their symptoms, treatment and expected goals.

## 2022-01-27 NOTE — GROUP NOTE
Group Therapy Note    Date: 1/27/2022    Group Start Time: 1600  Group End Time: 6963  Group Topic: Recreational    TONYA Archuleta RN        Group Therapy Note    Attendees:7    Recreational Bingo. Guided by nursing students to encourage engagement and appropriate interaction of peers. Participants functioned well with appropriate interaction and encouragement of each other. Activity was enjoyed by participants and bystanders.         Patient's Goal: Socialization     Notes:  Goal met    Status After Intervention:  Improved    Participation Level: Interactive    Participation Quality: Appropriate      Speech:  normal      Thought Process/Content: Logical      Affective Functioning: Congruent      Mood: euthymic      Level of consciousness:  Alert      Response to Learning: Progressing to goal      Endings: None Reported    Modes of Intervention: Socialization      Discipline Responsible: Registered Nurse      Signature:  Nic King RN

## 2022-01-27 NOTE — BH NOTE
Patient requesting Ibuprofen for left foot pain rates 7/10. Patient medicated with Ibuprofen 400 mg po for pain.

## 2022-01-27 NOTE — BH NOTE
Purposeful Rounding    Patient Location: Day room    Patient willing to engage in conversation: Yes    Presentation/behavior: Attention Seeking    Affect: Inappropriate/Incongruent    Concerns reported: None    PRN medications given: N/A    Environmental assessment: No safety hazards noted    Fall prevention interventions in place: Lighting appropriate, Room free of clutter and Clear path to bathroom    Daily [place] Fall Risk Score: 55    Daily Rojas Fall Risk Score: 0      Electronically signed by Cordell Zapata RN on 1/27/22 at 6:00 PM EST

## 2022-01-28 LAB
ESTIMATED AVERAGE GLUCOSE: 119.8 MG/DL
HBA1C MFR BLD: 5.8 %

## 2022-01-28 PROCEDURE — 6370000000 HC RX 637 (ALT 250 FOR IP): Performed by: PSYCHIATRY & NEUROLOGY

## 2022-01-28 PROCEDURE — 1240000000 HC EMOTIONAL WELLNESS R&B

## 2022-01-28 PROCEDURE — 99233 SBSQ HOSP IP/OBS HIGH 50: CPT | Performed by: PSYCHIATRY & NEUROLOGY

## 2022-01-28 RX ADMIN — NICOTINE POLACRILEX 2 MG: 2 GUM, CHEWING BUCCAL at 08:46

## 2022-01-28 RX ADMIN — NICOTINE POLACRILEX 2 MG: 2 GUM, CHEWING BUCCAL at 16:51

## 2022-01-28 RX ADMIN — NICOTINE POLACRILEX 2 MG: 2 GUM, CHEWING BUCCAL at 21:13

## 2022-01-28 RX ADMIN — NICOTINE POLACRILEX 2 MG: 2 GUM, CHEWING BUCCAL at 06:38

## 2022-01-28 RX ADMIN — NICOTINE POLACRILEX 2 MG: 2 GUM, CHEWING BUCCAL at 13:16

## 2022-01-28 RX ADMIN — NICOTINE POLACRILEX 2 MG: 2 GUM, CHEWING BUCCAL at 18:54

## 2022-01-28 NOTE — GROUP NOTE
Group Therapy Note    Date: 1/27/2022    Group Start Time: 2020  Group End Time: 2045  Group Topic: Wrap-Up    [facility]        Group Therapy Note    Attendees: Goals and importance of goal setting discussed. Night time milieu activities discussed.          Patient's Goal:  To relax and to stop \"the suicidal tendencies\"    Notes:  Laughing and inappropriate     Status After Intervention:  Decompensated    Participation Level: Monopolizing    Participation Quality: Inappropriate and Intrusive      Speech:  pressured      Thought Process/Content: Flight of ideas      Affective Functioning: Exaggerated      Mood: euthymic      Level of consciousness:  Inattentive      Response to Learning: Progressing to goal      Endings: None Reported    Modes of Intervention: Support      Discipline Responsible: Behavorial Health Tech      Signature:  Georgette Skiff

## 2022-01-28 NOTE — GROUP NOTE
Group Therapy Note    Date: 1/28/2022    Group Start Time: 1000  Group End Time: 1100  Group Topic: Cognitive Skills    [id number] [facility]        Group Therapy Note    Attendees: 11      Group members worked together and answered various questions while throwing yarn. When finished, the members cut a tiny piece of the yarn to remind them of this experience and how they all came together. Notes:  Jimmie Aguayo attended group for the full duration. Jimmie Olivier completed the activity and interacted appropriately with others in the group. Status After Intervention:  Improved    Participation Level:  Active Listener and Interactive    Participation Quality: Appropriate and Attentive      Speech:  normal      Thought Process/Content: Logical      Affective Functioning: Congruent      Mood: Brightened, Engaging     Level of consciousness:  Alert      Response to Learning: Able to verbalize current knowledge/experience      Endings: None Reported    Modes of Intervention: Socialization, Exploration and Activity      Discipline Responsible: Behavorial Health Tech      Signature:  RADHA Agarwal

## 2022-01-28 NOTE — FLOWSHEET NOTE
Purposeful Rounding    Patient Location: Day room    Patient willing to engage in conversation: Yes    Presentation/behavior: Cooperative and Pleasant    Affect: Brightens with interaction    Concerns reported: None    PRN medications given: None    Environmental assessment: No safety hazards noted    Fall prevention interventions in place: Yellow non-skid socks on    Daily [place] Fall Risk Score: 47    Daily Rojas Fall Risk Score: Low risk      Electronically signed by Rocky Martins RN on 1/27/22 at 9:58 PM EST

## 2022-01-28 NOTE — PROGRESS NOTES
Department of Psychiatry  AttendingProgress Note  Chief Complaint: depression  Julio Cesar Schmidt is active in program and is very complimentary about it. He was engaged today and talked about how he wants to be stable and get his own place. He is not appearing manic at this time. [facility] [place] . No threats of self harm  Patient's chart was reviewed and collaborated with  about the treatment plan. SUBJECTIVE:    Patient is feeling better. Suicidal ideation:  denies suicidal ideation. Patient does not have medication side effects. ROS: Patient has new complaints: no  Sleeping adequately:  Yes   Appetite adequate: Yes  Attending groups: Yes  Visitors:No    OBJECTIVE    Physical  VITALS:  BP (!) 130/92   Pulse 85   Temp 97.9 °F (36.6 °C) (Oral)   Resp 14   Ht 6' (1.829 m)   Wt 200 lb (90.7 kg)   SpO2 98%   BMI 27.12 kg/m²     Mental Status Examination:  Patients appearance was street clothes. Thoughts are Goal directed. Homicidal ideations none. No abnormal movements, tics or mannerisms. Memory intact Aims 0. Concentration Fair. Alert and oriented X 4. Insight and Judgement impaired insight. Patient was cooperative.  Patient gait normal. Mood within normal limits, affect normal affect Hallucinations Absent, suicidal ideations no specific plan to harm self Speech normal volume  Data  Labs:   Admission on 01/25/2022   Component Date Value Ref Range Status    WBC 01/25/2022 11.9* 4.0 - 11.0 K/uL Final    RBC 01/25/2022 4.74  4.20 - 5.90 M/uL Final    Hemoglobin 01/25/2022 15.4  13.5 - 17.5 g/dL Final    Hematocrit 01/25/2022 44.7  40.5 - 52.5 % Final    MCV 01/25/2022 94.4  80.0 - 100.0 fL Final    MCH 01/25/2022 32.5  26.0 - 34.0 pg Final    MCHC 01/25/2022 34.4  31.0 - 36.0 g/dL Final    RDW 01/25/2022 13.8  12.4 - 15.4 % Final    Platelets 16/98/0639 302  135 - 450 K/uL Final    MPV 01/25/2022 8.4  5.0 - 10.5 fL Final    Neutrophils % 01/25/2022 51.6  % Final    Lymphocytes % 01/25/2022 35.3  % Final    Monocytes % 01/25/2022 10.2  % Final    Eosinophils % 01/25/2022 2.3  % Final    Basophils % 01/25/2022 0.6  % Final    Neutrophils Absolute 01/25/2022 6.2  1.7 - 7.7 K/uL Final    Lymphocytes Absolute 01/25/2022 4.2  1.0 - 5.1 K/uL Final    Monocytes Absolute 01/25/2022 1.2  0.0 - 1.3 K/uL Final    Eosinophils Absolute 01/25/2022 0.3  0.0 - 0.6 K/uL Final    Basophils Absolute 01/25/2022 0.1  0.0 - 0.2 K/uL Final    Sodium 01/25/2022 136  136 - 145 mmol/L Final    Potassium 01/25/2022 3.8  3.5 - 5.1 mmol/L Final    Chloride 01/25/2022 104  99 - 110 mmol/L Final    CO2 01/25/2022 20* 21 - 32 mmol/L Final    Anion Gap 01/25/2022 12  3 - 16 Final    Glucose 01/25/2022 149* 70 - 99 mg/dL Final    BUN 01/25/2022 15  7 - 20 mg/dL Final    CREATININE 01/25/2022 0.8* 0.9 - 1.3 mg/dL Final    GFR Non- 01/25/2022 >60  >60 Final    Comment: >60 mL/min/1.73m2 EGFR, calc. for ages 25 and older using the  MDRD formula (not corrected for weight), is valid for stable  renal function.  GFR  01/25/2022 >60  >60 Final    Comment: Chronic Kidney Disease: less than 60 ml/min/1.73 sq.m. Kidney Failure: less than 15 ml/min/1.73 sq.m. Results valid for patients 18 years and older.       Calcium 01/25/2022 9.5  8.3 - 10.6 mg/dL Final    Total Protein 01/25/2022 7.3  6.4 - 8.2 g/dL Final    Albumin 01/25/2022 4.4  3.4 - 5.0 g/dL Final    Albumin/Globulin Ratio 01/25/2022 1.5  1.1 - 2.2 Final    Total Bilirubin 01/25/2022 <0.2  0.0 - 1.0 mg/dL Final    Alkaline Phosphatase 01/25/2022 96  40 - 129 U/L Final    ALT 01/25/2022 26  10 - 40 U/L Final    AST 01/25/2022 17  15 - 37 U/L Final    Amphetamine Screen, Urine 01/25/2022 Neg  Negative <1000ng/mL Final    Barbiturate Screen, Ur 01/25/2022 Neg  Negative <200 ng/mL Final    Benzodiazepine Screen, Urine 01/25/2022 Neg  Negative <200 ng/mL Final    Cannabinoid Scrn, Ur 01/25/2022 POSITIVE* Negative <50 ng/mL Final    Cocaine Metabolite Screen, Urine 01/25/2022 Neg  Negative <300 ng/mL Final    Opiate Scrn, Ur 01/25/2022 Neg  Negative <300 ng/mL Final    Comment: \"Therapeutic levels of pain medication, especially oxycontin and synthetic  opioids, may not be detected by this Methodology. Pain management screen  panel  Drug panel-PM-Hi Res Ur, Interp (PAIN) should be considered for drug  monitoring \".  PCP Screen, Urine 01/25/2022 Neg  Negative <25 ng/mL Final    Methadone Screen, Urine 01/25/2022 Neg  Negative <300 ng/mL Final    Propoxyphene Scrn, Ur 01/25/2022 Neg  Negative <300 ng/mL Final    Oxycodone Urine 01/25/2022 Neg  Negative <100 ng/ml Final    pH, UA 01/25/2022 6.0   Final    Comment: Urine pH less than 5.0 or greater than 8.0 may indicate sample adulteration. Another sample should be collected if clinically  indicated.  Drug Screen Comment: 01/25/2022 see below   Final    Comment: This method is a screening test to detect only these drug  classes as part of a medical workup. Confirmatory testing  by another method should be ordered if clinically indicated.       Ethanol Lvl 01/25/2022 None Detected  mg/dL Final    Comment:    None Detected  Conversion factor:  100 mg/dl = .100 g/dl  For Medical Purposes Only      Color, UA 01/25/2022 Yellow  Straw/Yellow Final    Clarity, UA 01/25/2022 Clear  Clear Final    Glucose, Ur 01/25/2022 Negative  Negative mg/dL Final    Bilirubin Urine 01/25/2022 Negative  Negative Final    Ketones, Urine 01/25/2022 Negative  Negative mg/dL Final    Specific Gravity, UA 01/25/2022 >=1.030  1.005 - 1.030 Final    Blood, Urine 01/25/2022 Negative  Negative Final    pH, UA 01/25/2022 6.0  5.0 - 8.0 Final    Protein, UA 01/25/2022 Negative  Negative mg/dL Final    Urobilinogen, Urine 01/25/2022 0.2  <2.0 E.U./dL Final    Nitrite, Urine 01/25/2022 Negative  Negative Final    Leukocyte Esterase, Urine 01/25/2022 Negative  Negative Final    Microscopic Examination 01/25/2022 Not Indicated   Final    Urine Type 01/25/2022 NotGiven   Final    Urine received in a container without preservatives.  Acetaminophen Level 01/25/2022 <5* 10 - 30 ug/mL Final    Comment: Therapeutic Range: 10.0-30.0 ug/mL  Toxic: >=302 ug/mL      Salicylate, Serum 77/03/5994 <0.3* 15.0 - 30.0 mg/dL Final    Comment: Therapeutic Range: 15.0-30.0 mg/dL  Toxic: >30.0 mg/dL      SARS-CoV-2 RNA, RT PCR 01/26/2022 NOT DETECTED  NOT DETECTED Final    Comment: Not Detected results do not preclude SARS-CoV-2 infection and  should not be used as the sole basis for patient management  decisions. Results must be combined with clinical observations,  patient history, and epidemiological information. Testing was performed using [facility] LUIS SARS-CoV-2 and Influenza A/B  nucleic acid assay. This test is a multiplex Real-Time Reverse  Transcriptase Polymerase Chain Reaction (RT-PCR)-based in vitro  diagnostic test intended for the qualitative detection of nucleic  acids from SARS-CoV-2, influenza A, and influenza B in nasopharyngeal  and nasal swab specimens for use under the FDAs Emergency Use  Authorization (EUA) only.     Patient Fact Sheet:  FindDrives.pl  Provider Fact Sheet: FindDrives.pl  EUA: FindDrives.pl  IFU: FindDrives.pl    Methodology:  RT-PCR      INFLUENZA A 01/26/2022 NOT DETECTED  NOT DETECTED Final    INFLUENZA B 01/26/2022 NOT DETECTED  NOT DETECTED Final    TSH 01/25/2022 2.21  0.27 - 4.20 uIU/mL Final    Cholesterol, Total 01/25/2022 221* 0 - 199 mg/dL Final    Triglycerides 01/25/2022 187* 0 - 150 mg/dL Final    HDL 01/25/2022 42  40 - 60 mg/dL Final    LDL Calculated 01/25/2022 142* <100 mg/dL Final    VLDL Cholesterol Calculated 01/25/2022 37  Not Established mg/dL Final    Hemoglobin A1C 01/25/2022 5.8  See comment % Final    Comment: Comment:  Diagnosis of Diabetes: > or = 6.5%  Increased risk of diabetes (Prediabetes): 5.7-6.4%  Glycemic Control: Nonpregnant Adults: <7.0%                    Pregnant: <6.0%        eAG 01/25/2022 119.8  mg/dL Final            Medications  Current Facility-Administered Medications: acetaminophen (TYLENOL) tablet 650 mg, 650 mg, Oral, Q4H PRN  ibuprofen (ADVIL;MOTRIN) tablet 400 mg, 400 mg, Oral, Q6H PRN  magnesium hydroxide (MILK OF MAGNESIA) 400 MG/5ML suspension 30 mL, 30 mL, Oral, Daily PRN  aluminum & magnesium hydroxide-simethicone (MAALOX) 200-200-20 MG/5ML suspension 30 mL, 30 mL, Oral, Q6H PRN  hydrOXYzine (VISTARIL) capsule 50 mg, 50 mg, Oral, TID PRN  OLANZapine (ZYPREXA) tablet 10 mg, 10 mg, Oral, Q8H PRN **OR** OLANZapine (ZYPREXA) injection 10 mg, 10 mg, IntraMUSCular, Q8H PRN  sterile water injection 2.1 mL, 2.1 mL, IntraMUSCular, Q8H PRN  diphenhydrAMINE (BENADRYL) injection 50 mg, 50 mg, IntraMUSCular, Q4H PRN  melatonin tablet 3 mg, 3 mg, Oral, Nightly PRN  nicotine (NICODERM CQ) 14 MG/24HR 1 patch, 1 patch, TransDERmal, Daily  nicotine polacrilex (NICORETTE) gum 2 mg, 2 mg, Oral, Q2H PRN    ASSESSMENT AND PLAN    Principal Problem:    Bipolar disorder, current episode manic severe with psychotic features ([address]  Active Problems:    Injury of right great toe    Tobacco dependence    Homelessness    Encounter for routine adult medical examination    Personality disorder with predominantly sociopathic or asocial manifestation ([address]    Suicidal ideation  Resolved Problems:    * No resolved hospital problems. *       1. Patient s symptoms   are improving  2. Probable discharge is next week  3. Discharge planning is complete  4. Suicidal ideation is none  5. Total time with patient was 40 minutes and more than 50 % of that time was spent counseling the patient on their symptoms, treatment and expected goals.

## 2022-01-28 NOTE — PLAN OF CARE
Problem: Altered Mood, Manic Behavior:  Goal: Able to verbalize decrease in frequency and intensity of racing thoughts  Description: Able to verbalize decrease in frequency and intensity of racing thoughts  Outcome: Ongoing  Goal: Absence of self-harm  Description: Absence of self-harm  Outcome: Ongoing  Goal: Ability to interact with others will improve  Description: Ability to interact with others will improve  Outcome: Ongoing  Goal: Mood stable  Description: Mood stable  Outcome: Ongoing    Pt is alert and oriented x4. Pt has been visible on the unit, social w/ peers. Refused nicotine patch scheduled for the AM, stated he would rather have nicotine gum. Given nicotine gum PRN x2. Pt had one angry outburst on the phone this afternoon but was redirected very quickly, otherwise pt was pleasant, calm, and cooperative. Denies all, no RTIS noted. Denies any pain at this time, will continue to monitor.

## 2022-01-28 NOTE — CARE COORDINATION
[facility]  Treatment Team Note  Day 3    Review Date & Time: 0900  1/28/22    Patient was not in treatment team      Status EXAM:   Status and Exam  Normal: No  Facial Expression: Exaggerated  Affect: Incongruent  Level of Consciousness: Alert  Mood:Normal: No  Mood: Depressed,Labile,Sad,Helpless  Motor Activity:Normal: Yes  Interview Behavior: Cooperative,Evasive  Preception: [place] to Person,[place] to Time,[place] to Place  Attention:Normal: No  Attention: Distractible  Thought Processes: Perseveration  Thought Content:Normal: No  Thought Content: Compulsions  Hallucinations: None (Patient denies)  Delusions: No  Memory:Normal: Yes  Insight and Judgment: No  Insight and Judgment: Poor Judgment,Poor Insight  Present Suicidal Ideation: No  Present Homicidal Ideation: No      Suicide Risk CSSR-S:  1) Within the past month, have you wished you were dead or wished you could go to sleep and not wake up? : Yes  2) Have you actually had any thoughts of killing yourself? : Yes  3) Have you been thinking about how you might kill yourself? : No  5) Have you started to work out or worked out the details of how to kill yourself? Do you intend to carry out this plan? : No  6) Have you ever done anything, started to do anything, or prepared to do anything to end your life?: No      PLAN/TREATMENT RECOMMENDATIONS UPDATE: Patient will take medication as prescribed, eat 75% of meals, attend groups, participate in milieu activities, participate in treatment team and care planning for discharge and follow up.         David Knight RN

## 2022-01-28 NOTE — PLAN OF CARE
Patient alert and oriented x 3. Patient visible on the milieu more social with peers this evening. Patient rates Depression 5/10 and Anxiety 0/10. Patient denies SI/HI/A/V/H. Patient attended and participated in wrap up group. Patient doesn't have HS medications scheduled. Patient denies self harm. Patient watching T.V. at present. No c/o's voiced at present.

## 2022-01-28 NOTE — BH NOTE
Patient requesting nicorette gum for nicotine cravings. Patient medicated with nicorette gum 2 mg po for nicotine cravings.

## 2022-01-28 NOTE — FLOWSHEET NOTE
01/28/22 1147   Encounter Summary   Services provided to: Patient   Referral/Consult From: [facility]   Continue Visiting   (1.28. Spiritual Support with listening)   Complexity of Encounter Moderate   Length of Encounter 45 minutes

## 2022-01-28 NOTE — BH NOTE
Patient requesting nicorette gum for nicotine cravings. Patient medicated with nicorette gum 4 mg po.

## 2022-01-29 PROCEDURE — 6370000000 HC RX 637 (ALT 250 FOR IP): Performed by: PSYCHIATRY & NEUROLOGY

## 2022-01-29 PROCEDURE — 1240000000 HC EMOTIONAL WELLNESS R&B

## 2022-01-29 RX ADMIN — NICOTINE POLACRILEX 2 MG: 2 GUM, CHEWING BUCCAL at 20:30

## 2022-01-29 RX ADMIN — NICOTINE POLACRILEX 2 MG: 2 GUM, CHEWING BUCCAL at 08:34

## 2022-01-29 RX ADMIN — NICOTINE POLACRILEX 2 MG: 2 GUM, CHEWING BUCCAL at 15:02

## 2022-01-29 RX ADMIN — NICOTINE POLACRILEX 2 MG: 2 GUM, CHEWING BUCCAL at 22:37

## 2022-01-29 RX ADMIN — NICOTINE POLACRILEX 2 MG: 2 GUM, CHEWING BUCCAL at 11:46

## 2022-01-29 RX ADMIN — NICOTINE POLACRILEX 2 MG: 2 GUM, CHEWING BUCCAL at 05:09

## 2022-01-29 NOTE — FLOWSHEET NOTE
01/29/22 1238   Status and Exam   Normal No   Facial Expression Exaggerated   Affect Incongruent   Level of Consciousness Alert   Mood:Normal No   Mood Depressed; Anxious   Motor Activity:Normal Yes   Interview Behavior Cooperative   Preception [place] to Person;[place] to Time;[place] to Place;[place] to Situation   Attention:Normal No   Attention Unable to Concentrate   Thought Processes Perseveration   Thought Content:Normal Yes   Hallucinations None   Delusions No   Memory:Normal Yes   Insight and Judgment No   Insight and Judgment Poor Judgment;Poor Insight   Present Suicidal Ideation No   Present Homicidal Ideation No
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
## 2022-01-29 NOTE — PLAN OF CARE
Problem: Altered Mood, Manic Behavior:  Goal: Able to sleep  Description: Able to sleep  Outcome: Ongoing  Goal: Able to verbalize decrease in frequency and intensity of racing thoughts  Description: Able to verbalize decrease in frequency and intensity of racing thoughts  Outcome: Ongoing  Goal: Ability to disclose and discuss suicidal ideas will improve  Description: Ability to disclose and discuss suicidal ideas will improve  Outcome: Ongoing  Goal: Ability to interact with others will improve  Description: Ability to interact with others will improve  Outcome: Ongoing

## 2022-01-29 NOTE — BH NOTE
Pt noted making sexually inappropriate comments to staff members and female patients. Writer and Kimberlee RN spoke with patient and explained that this is not acceptable and to be mindful of what he is saying. Pt denied accusations.

## 2022-01-29 NOTE — PLAN OF CARE
Problem: Altered Mood, Manic Behavior:  Goal: Absence of self-harm  Description: Absence of self-harm  1/28/2022 2237 by Sravani Flores RN  Outcome: Ongoing     Problem: Altered Mood, Manic Behavior:  Goal: Able to verbalize decrease in frequency and intensity of racing thoughts  Description: Able to verbalize decrease in frequency and intensity of racing thoughts  1/28/2022 2237 by Sravani Flores RN  Outcome: Ongoing   Lisa Gray has been out in the day room talking on the phone this shift. Patient denies current SI/HI, denies A/V/H. Patient pleasant on approach. Appears to have flight of ideas at times as evidenced by stating he was talking to Mr Casey Ingram ([employer]) on the phone. Patient states he knows the owner of [employer] and that Mr. Casey Ingram wants him to play chess with him in a castle. Absent of self harm.

## 2022-01-30 PROCEDURE — 99233 SBSQ HOSP IP/OBS HIGH 50: CPT | Performed by: PSYCHIATRY & NEUROLOGY

## 2022-01-30 PROCEDURE — 1240000000 HC EMOTIONAL WELLNESS R&B

## 2022-01-30 PROCEDURE — 6370000000 HC RX 637 (ALT 250 FOR IP): Performed by: PSYCHIATRY & NEUROLOGY

## 2022-01-30 RX ADMIN — NICOTINE POLACRILEX 2 MG: 2 GUM, CHEWING BUCCAL at 12:25

## 2022-01-30 RX ADMIN — NICOTINE POLACRILEX 2 MG: 2 GUM, CHEWING BUCCAL at 14:28

## 2022-01-30 RX ADMIN — NICOTINE POLACRILEX 2 MG: 2 GUM, CHEWING BUCCAL at 08:32

## 2022-01-30 RX ADMIN — NICOTINE POLACRILEX 2 MG: 2 GUM, CHEWING BUCCAL at 17:48

## 2022-01-30 RX ADMIN — NICOTINE POLACRILEX 2 MG: 2 GUM, CHEWING BUCCAL at 22:11

## 2022-01-30 NOTE — GROUP NOTE
Group Therapy Note    Date: 1/29/2022    Group Start Time: 1:15 PM  Group End Time: 2:00 PM  Group Topic: IVETT Mo        Group Therapy Note    Attendees: 8       Patient's Goal: Pt will participate in transactional letter writing and share with the group what they wrote. Group activity assists pts in self expression, confrontation, and empathy. Notes: Pt shared his anger towards his ex to group. He verbalized that he wants her dead but would not do anything about it because he was leaving it in God's hands. Status After Intervention:  Unchanged    Participation Level:  Active Listener    Participation Quality: Appropriate, Attentive, Sharing and Supportive      Speech:  normal      Thought Process/Content: Linear      Affective Functioning: incongruent       Mood: elevated      Level of consciousness:  Alert, Oriented x4 and Attentive      Response to Learning: Resistant      Endings: None Reported    Modes of Intervention: Activity and Movement      Discipline Responsible: /Counselor      Signature:  IVETT Kelly

## 2022-01-30 NOTE — PROGRESS NOTES
Department of Psychiatry  Progress Note    Patient's chart was reviewed. Discussed with treatment team. Met with patient. SUBJECTIVE:      Active in the milieu with peers and programming. Elevated but sleeping ok; no psychotic symptoms. Lots of bluster. Evidently making inappropriate sexual comments to some peers. \"I'm supposed to be going to [place] but before I go I want to know what you've done for me. I don't want my time wasted if nothing has been done. \"    Set boundaries regarding his comments to peers.       ROS:   Patient has new complaints: no  Sleeping adequately:  Yes   Appetite adequate: Yes  Engaged in programming: Yes    OBJECTIVE:  VITALS:  BP (!) 130/94   Pulse 87   Temp 97.6 °F (36.4 °C) (Temporal)   Resp 18   Ht 6' (1.829 m)   Wt 200 lb (90.7 kg)   SpO2 97%   BMI 27.12 kg/m²     Mental Status Examination:    Appearance: fair grooming and hygiene  Behavior/Attitude toward examiner:   fair eye contact  Speech: mild pressured  Mood:  \"good\"  Affect:  elevated     Thought processes:  Goal directed, linear, no ROBERTO or gross disorganization  Thought Content: no SI, no HI, no delusions voiced, no obsessions  Perceptions: no AVH  Attention: attention span and concentration were intact to interview   Abstraction: intact  Cognition:  Alert and oriented to person, place, time, and situation, recall intact  Insight: Limited insight   Judgment: Limited judgment     Medication:  Scheduled:   nicotine  1 patch TransDERmal Daily        PRN:  acetaminophen, ibuprofen, magnesium hydroxide, aluminum & magnesium hydroxide-simethicone, hydrOXYzine, OLANZapine **OR** OLANZapine, sterile water, diphenhydrAMINE, melatonin, nicotine polacrilex     ASSESSMENT AND PLAN:    Principal Problem:    Bipolar disorder, current episode manic severe with psychotic features ([facility] [address]  Active Problems:    Injury of right great toe    Tobacco dependence    Homelessness    Encounter for routine adult medical examination 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 *       1. Patient s symptoms   show no further change  2. Probable discharge is tomorrow  3. Discharge planning is complete  4. Suicidal ideation is absent    Total time with patient was 35 minutes and more than 50 % of that time was spent counseling the patient on their symptoms, treatment, and expected goals.      Sena Zuniga MD

## 2022-01-30 NOTE — PLAN OF CARE
Problem: Altered Mood, Manic Behavior:  Goal: Able to verbalize decrease in frequency and intensity of racing thoughts  Description: Able to verbalize decrease in frequency and intensity of racing thoughts  1/30/2022 1346 by Miguel Geller RN  Outcome: Ongoing     Problem: Altered Mood, Manic Behavior:  Goal: Mood stable  Description: Mood stable  Outcome: Ongoing   Patient has been visible out on the unit since the start of shift. He is social with select peers that it seems he can get things from or can relate from experiences in retirement. He is A&Ox4 and denies that he is having thoughts to want to harm self or others. He denies that he is experiencing hallucinations. He did not make any delusional statements while interacting with staff. He needs much redirection on his behaviors on the unit and being intrusive with others. He does want to challenge staff when he is redirected on behavior and had more limits set. He was informed by the provider that if his behavior did not change he would be discharged. He is able to follow directions because he did calm down and stop being as intrusive and inappropriate in conversation. He did talk with staff about how he is not getting the resource he needed in the hospital which is an ID so he can go to a shelter. He was told on Friday that he would probably be sent to [facility] at discharge. He does not understand why he would be sent there. Staff informed him that maybe the shelter that has availability in it. He claims that he has been in contact with the [facility]. and they will put him up in a hotel for 28 days if he had an ID. He then changed that since he will not be able to get an ID and stay homeless he \"might as well keep going [address] [place] if I'm going to be homeless and be somewhere it is warm. \"

## 2022-01-30 NOTE — PLAN OF CARE
Problem: Altered Mood, Manic Behavior:  Goal: Absence of self-harm  Description: Absence of self-harm  Outcome: Ongoing     Problem: Altered Mood, Manic Behavior:  Goal: Able to verbalize decrease in frequency and intensity of racing thoughts  Description: Able to verbalize decrease in frequency and intensity of racing thoughts  1/30/2022 0524 by Jhony Burks RN  Outcome: Ongoing   Estela Parry has been out in the day room and social with peers this shift. Patient denies current SI/HI, denies A/V/H. Absent of self harm.

## 2022-01-30 NOTE — GROUP NOTE
Group Therapy Note    Date: 1/30/2022    Group Start Time: 10:00 AM  Group End Time: 10:30 AM  Group Topic: [id number], [place]        Group Therapy Note    Attendees: 11      Patient's Goal:  \"No comment\"    Notes:  Pt was appropriate and participated in group    Status After Intervention:  Unchanged    Participation Level: Minimal    Participation Quality: Resistant      Speech:  normal      Thought Process/Content: Logical  Linear      Affective Functioning: Flat      Mood: euthymic      Level of consciousness:  Alert      Response to Learning: Resistant      Endings: None Reported    Modes of Intervention: Socialization      Discipline Responsible: /Counselor      Signature:  IVETT Solano

## 2022-01-30 NOTE — GROUP NOTE
Group Therapy Note    Date: 1/29/2022    Group Start Time: 8:30 PM  Group End Time: 9:00 PM  Group Topic: [address]        Group Therapy Note    Attendees: 11       Patient's Goal:  Think before I speak and to have self control    Notes: Met goal. Pt apologized to group members over the comments he made earlier that upset pts and staff. Apology did not appear to be sincere. Status After Intervention:  Unchanged    Participation Level:  Active Listener and Interactive    Participation Quality: Appropriate, Attentive, Sharing and Resistant      Speech:  normal      Thought Process/Content: Logical  Linear      Affective Functioning: Congruent      Mood: euthymic      Level of consciousness:  Alert oriented x4      Response to Learning: Resistant, able to explain new learnings      Endings: None Reported    Modes of Intervention: Socialization and Activity      Discipline Responsible: /Counselor      Signature:  IVETT Nicolas

## 2022-01-30 NOTE — GROUP NOTE
Group Therapy Note    Date: 1/29/2022    Group Start Time: 4:00 PM  Group End Time: 4:30 PM  Group Topic: Relaxation    [facility]        Group Therapy Note    Attendees: 7       Patient's Goal: Participate in 20 minute guided meditation to achieve relaxation and practice mindfulness.      Notes:  Pt was inattentive during group.  Pt stared at facilitator the entire group not breaking eye contact.      Status After Intervention:  Unchanged    Participation Level: None    Participation Quality: Inappropriate, Intrusive and Resistant      Speech:  normal      Thought Process/Content: Linear      Affective Functioning: Congruent      Mood: elevated      Level of consciousness:  Alert, Oriented x4 and Inattentive      Response to Learning: Resistant      Endings: None Reported    Modes of Intervention: Activity and Movement      Discipline Responsible: /Counselor      Signature:  IVETT Rivera

## 2022-01-31 VITALS
BODY MASS INDEX: 27.09 KG/M2 | WEIGHT: 200 LBS | HEIGHT: 72 IN | TEMPERATURE: 98 F | OXYGEN SATURATION: 96 % | SYSTOLIC BLOOD PRESSURE: 123 MMHG | RESPIRATION RATE: 16 BRPM | DIASTOLIC BLOOD PRESSURE: 88 MMHG | HEART RATE: 76 BPM

## 2022-01-31 PROCEDURE — 99239 HOSP IP/OBS DSCHRG MGMT >30: CPT | Performed by: PSYCHIATRY & NEUROLOGY

## 2022-01-31 PROCEDURE — 6370000000 HC RX 637 (ALT 250 FOR IP): Performed by: PSYCHIATRY & NEUROLOGY

## 2022-01-31 PROCEDURE — 5130000000 HC BRIDGE APPOINTMENT

## 2022-01-31 RX ADMIN — NICOTINE POLACRILEX 2 MG: 2 GUM, CHEWING BUCCAL at 10:49

## 2022-01-31 RX ADMIN — NICOTINE POLACRILEX 2 MG: 2 GUM, CHEWING BUCCAL at 08:20

## 2022-01-31 NOTE — PLAN OF CARE
Problem: Altered Mood, Manic Behavior:  Goal: Ability to disclose and discuss suicidal ideas will improve  Description: Ability to disclose and discuss suicidal ideas will improve  Outcome: Ongoing     Problem: Altered Mood, Manic Behavior:  Goal: Absence of self-harm  Description: Absence of self-harm  Outcome: Ongoing   Thedore Gowers has been out in the day room watching television with peers this shift.  Patient denies current SI/HI, denies A/V/H.

## 2022-01-31 NOTE — BH NOTE
[facility]  Discharge Note    Pt discharged with followings belongings:   Dental Appliances: None  Vision - Corrective Lenses: None  Hearing Aid: None  Jewelry: None  Body Piercings Removed: N/A  Clothing: [employer]undry / coat,Pants,Shirt,Sweater,Socks,Undergarments (Comment) (white undershirt X6, shifts black, teal x3, shortsx2,socksx6)  Were All Patient Medications Collected?: Not Applicable  Other Valuables: Other (Comment) (shampoo, bars of soap, )   Valuables sent home with patient or returned to patient. Patient education on aftercare instructions: yes. Information faxed to N/A by this writer  at 12:53 PM .Patient verbalize understanding of AVS:  yes. Status EXAM upon discharge:  Status and Exam  Normal: No  Facial Expression: Exaggerated  Affect: Incongruent  Level of Consciousness: Alert  Mood:Normal: No  Mood: Anxious,Labile  Motor Activity:Normal: Yes  Interview Behavior: Cooperative  Preception: [address] to Person,[address] to Time,[address] to Place,[address] to Situation  Attention:Normal: No  Attention: Distractible  Thought Processes: Tangential  Thought Content:Normal: No  Thought Content: Preoccupations  Hallucinations: None (Pt Denies)  Delusions: No  Memory:Normal: Yes  Insight and Judgment: No  Insight and Judgment: Poor Insight,Unrealistic  Present Suicidal Ideation: No  Present Homicidal Ideation: No      Metabolic Screening:    Lab Results   Component Value Date    LABA1C 5.8 01/25/2022       Lab Results   Component Value Date    CHOL 221 (H) 01/25/2022     Lab Results   Component Value Date    TRIG 187 (H) 01/25/2022     Lab Results   Component Value Date    HDL 42 01/25/2022     No components found for: [facility]  Lab Results   Component Value Date    LABVLDL 37 01/25/2022       Milagros Longoria RN    Bridge Appointment completed: Reviewed Discharge Instructions with patient.     Patient verbalizes understanding and agreement with the discharge plan using the teachback method.      Referral for Outpatient Tobacco Cessation Counseling, upon discharge (pj X if applicable and completed):    ( )  Hospital staff assisted patient to call Quit Line or faxed referral                                   during hospitalization                  (X)  Recognizing danger situations (included triggers and roadblocks), if not completed on admission                    (X)  Coping skills (new ways to manage stress, exercise, relaxation techniques, changing routine, distraction), if not completed on admission                                                           (X)  Basic information about quitting (benefits of quitting, techniques in how to quit, available resources, if not completed on admission  ( ) Referral for counseling faxed to Alex   ( ) Patient refused referral  ( ) Patient refused counseling  ( ) Patient refused smoking cessation medication upon discharge    Vaccinations (pj X if applicable and completed):  ( ) Patient states already received influenza vaccine elsewhere  ( ) Patient received influenza vaccine during this hospitalization  (X) Patient refused influenza vaccine at this time

## 2022-01-31 NOTE — PLAN OF CARE
Problem: Altered Mood, Manic Behavior:  Goal: Able to sleep  Description: Able to sleep  Outcome: Ongoing  Goal: Able to verbalize decrease in frequency and intensity of racing thoughts  Description: Able to verbalize decrease in frequency and intensity of racing thoughts  Outcome: Ongoing  Goal: Ability to disclose and discuss suicidal ideas will improve  Description: Ability to disclose and discuss suicidal ideas will improve  1/31/2022 1120 by Vinod Gerber RN  Outcome: Ongoing  Goal: Absence of self-harm  Description: Absence of self-harm  1/31/2022 1120 by Vinod Gerber RN  Outcome: Ongoing  Goal: Ability to interact with others will improve  Description: Ability to interact with others will improve  Outcome: Ongoing  Goal: Mood stable  Description: Mood stable  Outcome: Ongoing     Problem: Nutrition  Goal: Optimal nutrition therapy  Outcome: Ongoing  Goal: Understanding of nutritional guidelines  Outcome: Ongoing    Patient is interactive with staff. Patient denies SI/HI/AVH. Patient states they slept good last night. He reports feeling \"well. \" Patient is cooperative.

## 2022-01-31 NOTE — BH NOTE
Purposeful Rounding    Patient Location: Day room    Patient willing to engage in conversation: Yes    Presentation/behavior: Cooperative    Affect: Inappropriate/Incongruent    Concerns reported: None    PRN medications given: N/A    Environmental assessment: No safety hazards noted    Fall prevention interventions in place: Lighting appropriate, Room free of clutter and Clear path to bathroom    Daily [place] Fall Risk Score: 67    Daily Rojas Fall Risk Score: 0      Electronically signed by Maria Luisa Retana RN on 1/31/22 at 2:22 PM EST

## 2022-01-31 NOTE — GROUP NOTE
Group Therapy Note    Date: 1/31/2022    Group Start Time: 1100  Group End Time: 1150  Group Topic: Psychoeducation    [facility]    [address]        Group Therapy Note    Attendees: 12    Patients learned about the Cognitive Triangle and Cognitive Restructuring. Notes:  Lona Castro attended group and was engaged and participated in the group discussion and activity but was monopolizing and appeared manic.      Status After Intervention:  Improved    Participation Level: Interactive and Monopolizing    Participation Quality: Intrusive      Speech:  pressured and loud      Thought Process/Content: Logical      Affective Functioning: Congruent      Mood: euthymic      Level of consciousness:  Attentive      Response to Learning: Able to verbalize/acknowledge new learning and Progressing to goal      Endings: None Reported    Modes of Intervention: Education      Discipline Responsible: /Counselor      Signature:  [address]

## 2022-01-31 NOTE — BH NOTE
Purposeful Rounding     Patient Location: Day room     Patient willing to engage in conversation:  Yes     Presentation/behavior: Cooperative     Affect: Inappropriate/Incongruent     Concerns reported: None     PRN medications given: N/A     Environmental assessment: No safety hazards noted     Fall prevention interventions in place: Lighting appropriate, Room free of clutter and Clear path to bathroom     Daily [place] Fall Risk Score: 67     Daily Rojas Fall Risk Score: 0
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
## 2023-08-28 ENCOUNTER — APPOINTMENT (OUTPATIENT)
Dept: GENERAL RADIOLOGY | Age: 38
End: 2023-08-28
Payer: MEDICARE

## 2023-08-28 ENCOUNTER — APPOINTMENT (OUTPATIENT)
Dept: CT IMAGING | Age: 38
End: 2023-08-28
Payer: MEDICARE

## 2023-08-28 ENCOUNTER — HOSPITAL ENCOUNTER (EMERGENCY)
Age: 38
Discharge: HOME | End: 2023-08-28
Attending: EMERGENCY MEDICINE
Payer: MEDICARE

## 2023-08-28 VITALS
DIASTOLIC BLOOD PRESSURE: 86 MMHG | WEIGHT: 280 LBS | RESPIRATION RATE: 18 BRPM | OXYGEN SATURATION: 99 % | TEMPERATURE: 98.5 F | BODY MASS INDEX: 37.97 KG/M2 | HEART RATE: 78 BPM | SYSTOLIC BLOOD PRESSURE: 120 MMHG

## 2023-08-28 DIAGNOSIS — S00.83XA CONTUSION OF FACE, INITIAL ENCOUNTER: ICD-10-CM
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
## 2023-08-28 DIAGNOSIS — S39.012A LUMBAR STRAIN, INITIAL ENCOUNTER: ICD-10-CM

## 2023-08-28 DIAGNOSIS — S46.812A STRAIN OF LEFT TRAPEZIUS MUSCLE, INITIAL ENCOUNTER: ICD-10-CM

## 2023-08-28 PROCEDURE — 72125 CT NECK SPINE W/O DYE: CPT

## 2023-08-28 PROCEDURE — 72100 X-RAY EXAM L-S SPINE 2/3 VWS: CPT

## 2023-08-28 PROCEDURE — 6370000000 HC RX 637 (ALT 250 FOR IP): Performed by: EMERGENCY MEDICINE

## 2023-08-28 PROCEDURE — 73030 X-RAY EXAM OF SHOULDER: CPT

## 2023-08-28 PROCEDURE — 70450 CT HEAD/BRAIN W/O DYE: CPT

## 2023-08-28 PROCEDURE — 99284 EMERGENCY DEPT VISIT MOD MDM: CPT

## 2023-08-28 RX ORDER — IBUPROFEN 600 MG/1
600 TABLET ORAL EVERY 6 HOURS PRN
Qty: 30 TABLET | Refills: 0 | Status: SHIPPED | OUTPATIENT
Start: 2023-08-28

## 2023-08-28 RX ORDER — METHOCARBAMOL 500 MG/1
750 TABLET, FILM COATED ORAL ONCE
Status: COMPLETED | OUTPATIENT
Start: 2023-08-28 | End: 2023-08-28

## 2023-08-28 RX ORDER — METHOCARBAMOL 750 MG/1
750 TABLET, FILM COATED ORAL 4 TIMES DAILY
Qty: 40 TABLET | Refills: 0 | Status: SHIPPED | OUTPATIENT
Start: 2023-08-28 | End: 2023-09-07

## 2023-08-28 RX ORDER — ACETAMINOPHEN 325 MG/1
650 TABLET ORAL ONCE
Status: COMPLETED | OUTPATIENT
Start: 2023-08-28 | End: 2023-08-28

## 2023-08-28 RX ORDER — IBUPROFEN 400 MG/1
800 TABLET ORAL ONCE
Status: COMPLETED | OUTPATIENT
Start: 2023-08-28 | End: 2023-08-28

## 2023-08-28 RX ADMIN — ACETAMINOPHEN 650 MG: 325 TABLET ORAL at 20:30

## 2023-08-28 RX ADMIN — IBUPROFEN 800 MG: 200 TABLET, FILM COATED ORAL at 20:30

## 2023-08-28 RX ADMIN — METHOCARBAMOL 750 MG: 500 TABLET ORAL at 20:30

## 2023-08-28 ASSESSMENT — PAIN SCALES - GENERAL: PAINLEVEL_OUTOF10: 8

## 2023-08-28 ASSESSMENT — PAIN DESCRIPTION - LOCATION: LOCATION: BACK

## 2023-08-28 ASSESSMENT — PAIN - FUNCTIONAL ASSESSMENT: PAIN_FUNCTIONAL_ASSESSMENT: 0-10
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
## 2023-08-29 NOTE — DISCHARGE INSTRUCTIONS
Return to emergency department for worsening symptoms or other concerns. Follow-up with your primary care doctor in 2 to 3 days for recheck. Take ibuprofen on scheduled basis for pain in the next several days. Take Robaxin as needed for spasm and stiffness. Follow-up with your primary care doctor in 2 to 3 days for recheck, or you can establish care with the [facility]. clinic if you need a primary care doctor.

## 2024-04-23 ENCOUNTER — HOSPITAL ENCOUNTER (EMERGENCY)
Age: 39
Discharge: HOME | End: 2024-04-23
Payer: MEDICARE

## 2024-04-23 VITALS
TEMPERATURE: 98.3 F | OXYGEN SATURATION: 95 % | RESPIRATION RATE: 18 BRPM | HEIGHT: 71 IN | DIASTOLIC BLOOD PRESSURE: 97 MMHG | SYSTOLIC BLOOD PRESSURE: 136 MMHG | WEIGHT: 235 LBS | BODY MASS INDEX: 32.9 KG/M2 | HEART RATE: 98 BPM

## 2024-04-23 DIAGNOSIS — L21.9 SEBORRHEIC DERMATITIS OF SCALP: Primary | ICD-10-CM

## 2024-04-23 DIAGNOSIS — L03.90 CELLULITIS, UNSPECIFIED CELLULITIS SITE: ICD-10-CM

## 2024-04-23 PROCEDURE — 99283 EMERGENCY DEPT VISIT LOW MDM: CPT

## 2024-04-23 RX ORDER — CEPHALEXIN 500 MG/1
500 CAPSULE ORAL 2 TIMES DAILY
Qty: 14 CAPSULE | Refills: 0 | Status: SHIPPED | OUTPATIENT
Start: 2024-04-23 | End: 2024-04-30

## 2024-04-23 ASSESSMENT — ENCOUNTER SYMPTOMS
SHORTNESS OF BREATH: 0
SORE THROAT: 0
NAUSEA: 0
ABDOMINAL PAIN: 0
SINUS PRESSURE: 0
SINUS PAIN: 0
CHEST TIGHTNESS: 0
EYE DISCHARGE: 0
VOMITING: 0
EYE REDNESS: 0
RHINORRHEA: 0
CONSTIPATION: 0
COUGH: 0

## 2024-04-23 ASSESSMENT — PAIN - FUNCTIONAL ASSESSMENT: PAIN_FUNCTIONAL_ASSESSMENT: NONE - DENIES PAIN

## 2024-04-23 NOTE — ED PROVIDER NOTES
[facility]  ED  EMERGENCY DEPARTMENT ENCOUNTER        Pt Name: Javier Sawyer  MRN: [id number]  Birthdate 1985  Date of evaluation: 4/23/2024  Provider: Genevieve Jones PA-C  PCP: No primary care provider on file.  Note Started: 6:39 PM EDT 4/23/24      MICHAEL. I have evaluated this patient.        CHIEF COMPLAINT       Chief Complaint   Patient presents with    Wound Check     Pt reports over the past few months had been noticing his head has been itching more than normal. Pt reports had been picking at his head. Today pt had shaved his head and noticed for the first time wounds to his head that he would like evaluated.        HISTORY OF PRESENT ILLNESS: 1 or more Elements     History from : Patient    Limitations to history : None    Javier Sawyer is a 38 y.o. male who presents to the ER for evaluation of several months of irritating itchy lesions to his scalp that he has been scratching at patient is concerned it is in cancer.    Nursing Notes were all reviewed and agreed with or any disagreements were addressed in the HPI.    REVIEW OF SYSTEMS :      Review of Systems   Constitutional:  Negative for chills and fever.   HENT: Negative.  Negative for congestion, rhinorrhea, sinus pressure, sinus pain and sore throat.    Eyes:  Negative for discharge, redness and visual disturbance.   Respiratory:  Negative for cough, chest tightness and shortness of breath.    Cardiovascular:  Negative for chest pain and palpitations.   Gastrointestinal:  Negative for abdominal pain, constipation, diarrhea, nausea and vomiting.   Genitourinary:  Negative for difficulty urinating, dysuria and frequency.   Musculoskeletal: Negative.    Skin: Negative.    Neurological: Negative.  Negative for dizziness, weakness, numbness and headaches.   Psychiatric/Behavioral: Negative.     All other systems reviewed and are negative.      Positives and Pertinent negatives as per HPI.     SURGICAL HISTORY     Past Surgical

## 2024-05-04 ENCOUNTER — HOSPITAL ENCOUNTER (EMERGENCY)
Age: 39
Discharge: HOME | End: 2024-05-04
Attending: STUDENT IN AN ORGANIZED HEALTH CARE EDUCATION/TRAINING PROGRAM
Payer: MEDICARE

## 2024-05-04 ENCOUNTER — APPOINTMENT (OUTPATIENT)
Dept: GENERAL RADIOLOGY | Age: 39
End: 2024-05-04
Payer: MEDICARE

## 2024-05-04 VITALS
DIASTOLIC BLOOD PRESSURE: 87 MMHG | HEART RATE: 82 BPM | BODY MASS INDEX: 36.26 KG/M2 | OXYGEN SATURATION: 98 % | TEMPERATURE: 98.4 F | WEIGHT: 260 LBS | RESPIRATION RATE: 14 BRPM | SYSTOLIC BLOOD PRESSURE: 110 MMHG

## 2024-05-04 DIAGNOSIS — S39.012A ACUTE MYOFASCIAL STRAIN OF LUMBOSACRAL REGION, INITIAL ENCOUNTER: ICD-10-CM

## 2024-05-04 DIAGNOSIS — S90.111A CONTUSION OF RIGHT GREAT TOE WITHOUT DAMAGE TO NAIL, INITIAL ENCOUNTER: Primary | ICD-10-CM

## 2024-05-04 LAB
ANION GAP SERPL CALCULATED.3IONS-SCNC: 14 MMOL/L (ref 3–16)
BASOPHILS # BLD: 0.1 K/UL (ref 0–0.2)
BASOPHILS NFR BLD: 0.7 %
BUN SERPL-MCNC: 15 MG/DL (ref 7–20)
CALCIUM SERPL-MCNC: 8.4 MG/DL (ref 8.3–10.6)
CHLORIDE SERPL-SCNC: 101 MMOL/L (ref 99–110)
CK SERPL-CCNC: 295 U/L (ref 39–308)
CO2 SERPL-SCNC: 20 MMOL/L (ref 21–32)
CREAT SERPL-MCNC: 0.8 MG/DL (ref 0.9–1.3)
DEPRECATED RDW RBC AUTO: 13.2 % (ref 12.4–15.4)
EOSINOPHIL # BLD: 0.3 K/UL (ref 0–0.6)
EOSINOPHIL NFR BLD: 2.7 %
GFR SERPLBLD CREATININE-BSD FMLA CKD-EPI: >90 ML/MIN/{1.73_M2}
GLUCOSE SERPL-MCNC: 123 MG/DL (ref 70–99)
HCT VFR BLD AUTO: 42.7 % (ref 40.5–52.5)
HGB BLD-MCNC: 14.6 G/DL (ref 13.5–17.5)
LYMPHOCYTES # BLD: 3.9 K/UL (ref 1–5.1)
LYMPHOCYTES NFR BLD: 35.7 %
MCH RBC QN AUTO: 31.8 PG (ref 26–34)
MCHC RBC AUTO-ENTMCNC: 34.1 G/DL (ref 31–36)
MCV RBC AUTO: 93.2 FL (ref 80–100)
MONOCYTES # BLD: 1 K/UL (ref 0–1.3)
MONOCYTES NFR BLD: 9.1 %
NEUTROPHILS # BLD: 5.7 K/UL (ref 1.7–7.7)
NEUTROPHILS NFR BLD: 51.8 %
PLATELET # BLD AUTO: 312 K/UL (ref 135–450)
PMV BLD AUTO: 8.4 FL (ref 5–10.5)
POTASSIUM SERPL-SCNC: 3.9 MMOL/L (ref 3.5–5.1)
RBC # BLD AUTO: 4.58 M/UL (ref 4.2–5.9)
SODIUM SERPL-SCNC: 135 MMOL/L (ref 136–145)
WBC # BLD AUTO: 11.1 K/UL (ref 4–11)

## 2024-05-04 PROCEDURE — 99284 EMERGENCY DEPT VISIT MOD MDM: CPT

## 2024-05-04 PROCEDURE — 96374 THER/PROPH/DIAG INJ IV PUSH: CPT

## 2024-05-04 PROCEDURE — 82550 ASSAY OF CK (CPK): CPT

## 2024-05-04 PROCEDURE — 6370000000 HC RX 637 (ALT 250 FOR IP): Performed by: STUDENT IN AN ORGANIZED HEALTH CARE EDUCATION/TRAINING PROGRAM

## 2024-05-04 PROCEDURE — 6360000002 HC RX W HCPCS: Performed by: STUDENT IN AN ORGANIZED HEALTH CARE EDUCATION/TRAINING PROGRAM

## 2024-05-04 PROCEDURE — 80048 BASIC METABOLIC PNL TOTAL CA: CPT

## 2024-05-04 PROCEDURE — 96375 TX/PRO/DX INJ NEW DRUG ADDON: CPT

## 2024-05-04 PROCEDURE — 85025 COMPLETE CBC W/AUTO DIFF WBC: CPT

## 2024-05-04 PROCEDURE — 73630 X-RAY EXAM OF FOOT: CPT

## 2024-05-04 RX ORDER — KETOROLAC TROMETHAMINE 30 MG/ML
15 INJECTION, SOLUTION INTRAMUSCULAR; INTRAVENOUS ONCE
Status: COMPLETED | OUTPATIENT
Start: 2024-05-04 | End: 2024-05-04

## 2024-05-04 RX ORDER — ACETAMINOPHEN 500 MG
1000 TABLET ORAL ONCE
Status: COMPLETED | OUTPATIENT
Start: 2024-05-04 | End: 2024-05-04

## 2024-05-04 RX ORDER — ORPHENADRINE CITRATE 30 MG/ML
60 INJECTION INTRAMUSCULAR; INTRAVENOUS ONCE
Status: COMPLETED | OUTPATIENT
Start: 2024-05-04 | End: 2024-05-04

## 2024-05-04 RX ORDER — CYCLOBENZAPRINE HCL 10 MG
10 TABLET ORAL 3 TIMES DAILY PRN
Qty: 21 TABLET | Refills: 0 | Status: SHIPPED | OUTPATIENT
Start: 2024-05-04 | End: 2024-05-14

## 2024-05-04 RX ADMIN — ORPHENADRINE CITRATE 60 MG: 60 INJECTION INTRAMUSCULAR; INTRAVENOUS at 00:47

## 2024-05-04 RX ADMIN — ACETAMINOPHEN 1000 MG: 500 TABLET ORAL at 00:47

## 2024-05-04 RX ADMIN — KETOROLAC TROMETHAMINE 15 MG: 30 INJECTION, SOLUTION INTRAMUSCULAR at 00:46

## 2024-05-04 ASSESSMENT — PAIN - FUNCTIONAL ASSESSMENT: PAIN_FUNCTIONAL_ASSESSMENT: 0-10

## 2024-05-04 ASSESSMENT — PAIN SCALES - GENERAL: PAINLEVEL_OUTOF10: 10
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
## 2025-08-01 ENCOUNTER — APPOINTMENT (OUTPATIENT)
Dept: CT IMAGING | Age: 40
DRG: 885 | End: 2025-08-01
Payer: COMMERCIAL

## 2025-08-01 ENCOUNTER — HOSPITAL ENCOUNTER (INPATIENT)
Age: 40
LOS: 7 days | Discharge: HOME | DRG: 885 | End: 2025-08-08
Attending: EMERGENCY MEDICINE | Admitting: PSYCHIATRY & NEUROLOGY
Payer: COMMERCIAL

## 2025-08-01 DIAGNOSIS — M62.82 NON-TRAUMATIC RHABDOMYOLYSIS: ICD-10-CM

## 2025-08-01 DIAGNOSIS — F31.9 BIPOLAR 1 DISORDER (HCC): Primary | ICD-10-CM

## 2025-08-01 PROBLEM — F39 MOOD DISORDER: Status: ACTIVE | Noted: 2025-08-01

## 2025-08-01 LAB
ALBUMIN SERPL-MCNC: 4 G/DL (ref 3.4–5)
ALBUMIN/GLOB SERPL: 1.8 {RATIO} (ref 1.1–2.2)
ALP SERPL-CCNC: 57 U/L (ref 40–129)
ALT SERPL-CCNC: 91 U/L (ref 10–40)
AMPHETAMINES UR QL SCN>1000 NG/ML: ABNORMAL
ANION GAP SERPL CALCULATED.3IONS-SCNC: 13 MMOL/L (ref 3–16)
AST SERPL-CCNC: 181 U/L (ref 15–37)
BARBITURATES UR QL SCN>200 NG/ML: ABNORMAL
BASOPHILS # BLD: 0 K/UL (ref 0–0.2)
BASOPHILS NFR BLD: 0.2 %
BENZODIAZ UR QL SCN>200 NG/ML: ABNORMAL
BILIRUB SERPL-MCNC: 0.9 MG/DL (ref 0–1)
BILIRUB UR QL STRIP.AUTO: NEGATIVE
BUN SERPL-MCNC: 18 MG/DL (ref 7–20)
CALCIUM SERPL-MCNC: 9 MG/DL (ref 8.3–10.6)
CANNABINOIDS UR QL SCN>50 NG/ML: POSITIVE
CHLORIDE SERPL-SCNC: 95 MMOL/L (ref 99–110)
CK SERPL-CCNC: 8514 U/L (ref 39–308)
CLARITY UR: CLEAR
CO2 SERPL-SCNC: 23 MMOL/L (ref 21–32)
COCAINE UR QL SCN: ABNORMAL
COLOR UR: YELLOW
CREAT SERPL-MCNC: 1 MG/DL (ref 0.9–1.3)
DEPRECATED RDW RBC AUTO: 13.4 % (ref 12.4–15.4)
DRUG SCREEN COMMENT UR-IMP: ABNORMAL
EOSINOPHIL # BLD: 0 K/UL (ref 0–0.6)
EOSINOPHIL NFR BLD: 0.2 %
ETHANOLAMINE SERPL-MCNC: NORMAL MG/DL (ref 0–0.08)
FENTANYL SCREEN, URINE: ABNORMAL
GFR SERPLBLD CREATININE-BSD FMLA CKD-EPI: >90 ML/MIN/{1.73_M2}
GLUCOSE SERPL-MCNC: 98 MG/DL (ref 70–99)
GLUCOSE UR STRIP.AUTO-MCNC: NEGATIVE MG/DL
HCT VFR BLD AUTO: 40.9 % (ref 40.5–52.5)
HGB BLD-MCNC: 14.4 G/DL (ref 13.5–17.5)
HGB UR QL STRIP.AUTO: NEGATIVE
KETONES UR STRIP.AUTO-MCNC: ABNORMAL MG/DL
LEUKOCYTE ESTERASE UR QL STRIP.AUTO: NEGATIVE
LYMPHOCYTES # BLD: 1.3 K/UL (ref 1–5.1)
LYMPHOCYTES NFR BLD: 12 %
MAGNESIUM SERPL-MCNC: 2.74 MG/DL (ref 1.8–2.4)
MCH RBC QN AUTO: 31.8 PG (ref 26–34)
MCHC RBC AUTO-ENTMCNC: 35.2 G/DL (ref 31–36)
MCV RBC AUTO: 90.3 FL (ref 80–100)
METHADONE UR QL SCN>300 NG/ML: ABNORMAL
MONOCYTES # BLD: 1 K/UL (ref 0–1.3)
MONOCYTES NFR BLD: 9.1 %
NEUTROPHILS # BLD: 8.2 K/UL (ref 1.7–7.7)
NEUTROPHILS NFR BLD: 78.5 %
NITRITE UR QL STRIP.AUTO: NEGATIVE
OPIATES UR QL SCN>300 NG/ML: ABNORMAL
OXYCODONE UR QL SCN: ABNORMAL
PCP UR QL SCN>25 NG/ML: ABNORMAL
PH UR STRIP.AUTO: 6 [PH] (ref 5–8)
PH UR STRIP: 6 [PH]
PLATELET # BLD AUTO: 249 K/UL (ref 135–450)
PMV BLD AUTO: 8.5 FL (ref 5–10.5)
POTASSIUM SERPL-SCNC: 3.3 MMOL/L (ref 3.5–5.1)
PROT SERPL-MCNC: 6.2 G/DL (ref 6.4–8.2)
PROT UR STRIP.AUTO-MCNC: NEGATIVE MG/DL
RBC # BLD AUTO: 4.53 M/UL (ref 4.2–5.9)
SODIUM SERPL-SCNC: 131 MMOL/L (ref 136–145)
SP GR UR STRIP.AUTO: 1.01 (ref 1–1.03)
TSH SERPL DL<=0.005 MIU/L-ACNC: 1.61 UIU/ML (ref 0.27–4.2)
UA COMPLETE W REFLEX CULTURE PNL UR: ABNORMAL
UA DIPSTICK W REFLEX MICRO PNL UR: ABNORMAL
URN SPEC COLLECT METH UR: ABNORMAL
UROBILINOGEN UR STRIP-ACNC: 0.2 E.U./DL
WBC # BLD AUTO: 10.5 K/UL (ref 4–11)

## 2025-08-01 PROCEDURE — 99285 EMERGENCY DEPT VISIT HI MDM: CPT

## 2025-08-01 PROCEDURE — 85025 COMPLETE CBC W/AUTO DIFF WBC: CPT

## 2025-08-01 PROCEDURE — 83036 HEMOGLOBIN GLYCOSYLATED A1C: CPT

## 2025-08-01 PROCEDURE — 1240000000 HC EMOTIONAL WELLNESS R&B

## 2025-08-01 PROCEDURE — 6370000000 HC RX 637 (ALT 250 FOR IP): Performed by: EMERGENCY MEDICINE

## 2025-08-01 PROCEDURE — 80061 LIPID PANEL: CPT

## 2025-08-01 PROCEDURE — 82077 ASSAY SPEC XCP UR&BREATH IA: CPT

## 2025-08-01 PROCEDURE — 80053 COMPREHEN METABOLIC PANEL: CPT

## 2025-08-01 PROCEDURE — 84443 ASSAY THYROID STIM HORMONE: CPT

## 2025-08-01 PROCEDURE — 6370000000 HC RX 637 (ALT 250 FOR IP): Performed by: NURSE PRACTITIONER

## 2025-08-01 PROCEDURE — 80307 DRUG TEST PRSMV CHEM ANLYZR: CPT

## 2025-08-01 PROCEDURE — 82550 ASSAY OF CK (CPK): CPT

## 2025-08-01 PROCEDURE — 81003 URINALYSIS AUTO W/O SCOPE: CPT

## 2025-08-01 PROCEDURE — 83735 ASSAY OF MAGNESIUM: CPT

## 2025-08-01 RX ORDER — 0.9 % SODIUM CHLORIDE 0.9 %
1000 INTRAVENOUS SOLUTION INTRAVENOUS ONCE
Status: DISCONTINUED | OUTPATIENT
Start: 2025-08-01 | End: 2025-08-01

## 2025-08-01 RX ORDER — BENZTROPINE MESYLATE 1 MG/ML
2 INJECTION, SOLUTION INTRAMUSCULAR; INTRAVENOUS 2 TIMES DAILY PRN
Status: DISCONTINUED | OUTPATIENT
Start: 2025-08-01 | End: 2025-08-05

## 2025-08-01 RX ORDER — NICOTINE 21 MG/24HR
1 PATCH, TRANSDERMAL 24 HOURS TRANSDERMAL DAILY
Status: DISCONTINUED | OUTPATIENT
Start: 2025-08-01 | End: 2025-08-05

## 2025-08-01 RX ORDER — LORAZEPAM 2 MG/1
2 TABLET ORAL ONCE
Status: DISCONTINUED | OUTPATIENT
Start: 2025-08-01 | End: 2025-08-04

## 2025-08-01 RX ORDER — LORAZEPAM 2 MG/1
2 TABLET ORAL ONCE
Status: COMPLETED | OUTPATIENT
Start: 2025-08-01 | End: 2025-08-01

## 2025-08-01 RX ORDER — IBUPROFEN 400 MG/1
400 TABLET, FILM COATED ORAL EVERY 6 HOURS PRN
Status: DISCONTINUED | OUTPATIENT
Start: 2025-08-01 | End: 2025-08-05

## 2025-08-01 RX ORDER — QUETIAPINE FUMARATE 50 MG/1
50 TABLET, FILM COATED ORAL NIGHTLY
Status: ON HOLD | COMMUNITY
Start: 2025-04-03 | End: 2025-08-08 | Stop reason: HOSPADM

## 2025-08-01 RX ORDER — METHOCARBAMOL 750 MG/1
750 TABLET, FILM COATED ORAL 4 TIMES DAILY PRN
Status: ON HOLD | COMMUNITY
Start: 2025-07-24 | End: 2025-08-08 | Stop reason: HOSPADM

## 2025-08-01 RX ORDER — OLANZAPINE 10 MG/1
10 TABLET, FILM COATED ORAL EVERY 4 HOURS PRN
Status: DISCONTINUED | OUTPATIENT
Start: 2025-08-01 | End: 2025-08-08 | Stop reason: HOSPADM

## 2025-08-01 RX ORDER — MAGNESIUM HYDROXIDE/ALUMINUM HYDROXICE/SIMETHICONE 120; 1200; 1200 MG/30ML; MG/30ML; MG/30ML
30 SUSPENSION ORAL EVERY 6 HOURS PRN
Status: DISCONTINUED | OUTPATIENT
Start: 2025-08-01 | End: 2025-08-08 | Stop reason: HOSPADM

## 2025-08-01 RX ORDER — DIAZEPAM 10 MG/2ML
10 INJECTION, SOLUTION INTRAMUSCULAR; INTRAVENOUS ONCE
Status: DISCONTINUED | OUTPATIENT
Start: 2025-08-01 | End: 2025-08-04

## 2025-08-01 RX ORDER — POLYETHYLENE GLYCOL 3350 17 G
2 POWDER IN PACKET (EA) ORAL
Status: DISCONTINUED | OUTPATIENT
Start: 2025-08-01 | End: 2025-08-08 | Stop reason: HOSPADM

## 2025-08-01 RX ORDER — ACETAMINOPHEN 325 MG/1
650 TABLET ORAL EVERY 4 HOURS PRN
Status: DISCONTINUED | OUTPATIENT
Start: 2025-08-01 | End: 2025-08-05

## 2025-08-01 RX ORDER — TRAZODONE HYDROCHLORIDE 50 MG/1
50 TABLET ORAL NIGHTLY PRN
Status: DISCONTINUED | OUTPATIENT
Start: 2025-08-01 | End: 2025-08-08 | Stop reason: HOSPADM

## 2025-08-01 RX ADMIN — NICOTINE POLACRILEX 2 MG: 2 LOZENGE ORAL at 21:25

## 2025-08-01 RX ADMIN — LORAZEPAM 2 MG: 2 TABLET ORAL at 08:18

## 2025-08-01 RX ADMIN — TRAZODONE HYDROCHLORIDE 50 MG: 50 TABLET ORAL at 21:38

## 2025-08-01 ASSESSMENT — PATIENT HEALTH QUESTIONNAIRE - PHQ9
2. FEELING DOWN, DEPRESSED OR HOPELESS: NEARLY EVERY DAY
3. TROUBLE FALLING OR STAYING ASLEEP: NEARLY EVERY DAY
5. POOR APPETITE OR OVEREATING: NEARLY EVERY DAY
8. MOVING OR SPEAKING SO SLOWLY THAT OTHER PEOPLE COULD HAVE NOTICED. OR THE OPPOSITE, BEING SO FIGETY OR RESTLESS THAT YOU HAVE BEEN MOVING AROUND A LOT MORE THAN USUAL: NOT AT ALL
1. LITTLE INTEREST OR PLEASURE IN DOING THINGS: MORE THAN HALF THE DAYS
SUM OF ALL RESPONSES TO PHQ QUESTIONS 1-9: 16
6. FEELING BAD ABOUT YOURSELF - OR THAT YOU ARE A FAILURE OR HAVE LET YOURSELF OR YOUR FAMILY DOWN: NEARLY EVERY DAY
SUM OF ALL RESPONSES TO PHQ QUESTIONS 1-9: 19
7. TROUBLE CONCENTRATING ON THINGS, SUCH AS READING THE NEWSPAPER OR WATCHING TELEVISION: NOT AT ALL
4. FEELING TIRED OR HAVING LITTLE ENERGY: MORE THAN HALF THE DAYS
10. IF YOU CHECKED OFF ANY PROBLEMS, HOW DIFFICULT HAVE THESE PROBLEMS MADE IT FOR YOU TO DO YOUR WORK, TAKE CARE OF THINGS AT HOME, OR GET ALONG WITH OTHER PEOPLE: EXTREMELY DIFFICULT
9. THOUGHTS THAT YOU WOULD BE BETTER OFF DEAD, OR OF HURTING YOURSELF: NEARLY EVERY DAY
SUM OF ALL RESPONSES TO PHQ QUESTIONS 1-9: 19
SUM OF ALL RESPONSES TO PHQ QUESTIONS 1-9: 19

## 2025-08-01 ASSESSMENT — SLEEP AND FATIGUE QUESTIONNAIRES
DO YOU HAVE DIFFICULTY SLEEPING: YES
AVERAGE NUMBER OF SLEEP HOURS: 3
DO YOU USE A SLEEP AID: NO
SLEEP PATTERN: DIFFICULTY FALLING ASLEEP

## 2025-08-01 ASSESSMENT — LIFESTYLE VARIABLES
HOW OFTEN DO YOU HAVE A DRINK CONTAINING ALCOHOL: MONTHLY OR LESS
HOW MANY STANDARD DRINKS CONTAINING ALCOHOL DO YOU HAVE ON A TYPICAL DAY: 1 OR 2

## 2025-08-01 ASSESSMENT — PAIN - FUNCTIONAL ASSESSMENT: PAIN_FUNCTIONAL_ASSESSMENT: NONE - DENIES PAIN

## 2025-08-02 PROBLEM — E87.1 HYPONATREMIA: Status: ACTIVE | Noted: 2025-08-02

## 2025-08-02 PROBLEM — M62.82 NON-TRAUMATIC RHABDOMYOLYSIS: Status: ACTIVE | Noted: 2025-08-02

## 2025-08-02 PROBLEM — R74.01 TRANSAMINITIS: Status: ACTIVE | Noted: 2025-08-02

## 2025-08-02 PROBLEM — F31.9 BIPOLAR 1 DISORDER (HCC): Status: ACTIVE | Noted: 2025-08-02

## 2025-08-02 LAB
CHOLEST SERPL-MCNC: 147 MG/DL (ref 0–199)
HDLC SERPL-MCNC: 35 MG/DL (ref 40–60)
LDLC SERPL CALC-MCNC: 97 MG/DL
TRIGL SERPL-MCNC: 76 MG/DL (ref 0–150)
VLDLC SERPL CALC-MCNC: 15 MG/DL

## 2025-08-02 PROCEDURE — 1240000000 HC EMOTIONAL WELLNESS R&B

## 2025-08-02 PROCEDURE — 99221 1ST HOSP IP/OBS SF/LOW 40: CPT | Performed by: NURSE PRACTITIONER

## 2025-08-02 PROCEDURE — 99223 1ST HOSP IP/OBS HIGH 75: CPT | Performed by: NURSE PRACTITIONER

## 2025-08-02 PROCEDURE — 6370000000 HC RX 637 (ALT 250 FOR IP): Performed by: NURSE PRACTITIONER

## 2025-08-02 RX ORDER — QUETIAPINE FUMARATE 25 MG/1
25 TABLET, FILM COATED ORAL 2 TIMES DAILY PRN
Status: DISCONTINUED | OUTPATIENT
Start: 2025-08-02 | End: 2025-08-08 | Stop reason: HOSPADM

## 2025-08-02 RX ORDER — QUETIAPINE FUMARATE 25 MG/1
50 TABLET, FILM COATED ORAL NIGHTLY
Status: DISCONTINUED | OUTPATIENT
Start: 2025-08-02 | End: 2025-08-04

## 2025-08-02 RX ADMIN — NICOTINE POLACRILEX 2 MG: 2 LOZENGE ORAL at 13:12

## 2025-08-02 RX ADMIN — NICOTINE POLACRILEX 2 MG: 2 LOZENGE ORAL at 01:45

## 2025-08-02 RX ADMIN — NICOTINE POLACRILEX 2 MG: 2 LOZENGE ORAL at 20:12

## 2025-08-02 RX ADMIN — NICOTINE POLACRILEX 2 MG: 2 LOZENGE ORAL at 18:33

## 2025-08-02 RX ADMIN — QUETIAPINE FUMARATE 50 MG: 25 TABLET ORAL at 20:11

## 2025-08-02 RX ADMIN — NICOTINE POLACRILEX 2 MG: 2 LOZENGE ORAL at 10:30

## 2025-08-02 RX ADMIN — NICOTINE POLACRILEX 2 MG: 2 LOZENGE ORAL at 07:30

## 2025-08-02 ASSESSMENT — PATIENT HEALTH QUESTIONNAIRE - PHQ9
SUM OF ALL RESPONSES TO PHQ QUESTIONS 1-9: 0
1. LITTLE INTEREST OR PLEASURE IN DOING THINGS: NOT AT ALL
2. FEELING DOWN, DEPRESSED OR HOPELESS: NOT AT ALL

## 2025-08-03 LAB
EST. AVERAGE GLUCOSE BLD GHB EST-MCNC: 116.9 MG/DL
HBA1C MFR BLD: 5.7 %

## 2025-08-03 PROCEDURE — 6370000000 HC RX 637 (ALT 250 FOR IP): Performed by: NURSE PRACTITIONER

## 2025-08-03 PROCEDURE — 1240000000 HC EMOTIONAL WELLNESS R&B

## 2025-08-03 RX ADMIN — NICOTINE POLACRILEX 2 MG: 2 LOZENGE ORAL at 12:17

## 2025-08-03 RX ADMIN — NICOTINE POLACRILEX 2 MG: 2 LOZENGE ORAL at 03:21

## 2025-08-03 RX ADMIN — NICOTINE POLACRILEX 2 MG: 2 LOZENGE ORAL at 01:36

## 2025-08-03 RX ADMIN — NICOTINE POLACRILEX 2 MG: 2 LOZENGE ORAL at 19:06

## 2025-08-03 RX ADMIN — NICOTINE POLACRILEX 2 MG: 2 LOZENGE ORAL at 07:46

## 2025-08-03 RX ADMIN — NICOTINE POLACRILEX 2 MG: 2 LOZENGE ORAL at 14:12

## 2025-08-03 RX ADMIN — NICOTINE POLACRILEX 2 MG: 2 LOZENGE ORAL at 10:56

## 2025-08-03 RX ADMIN — NICOTINE POLACRILEX 2 MG: 2 LOZENGE ORAL at 08:58

## 2025-08-03 RX ADMIN — NICOTINE POLACRILEX 2 MG: 2 LOZENGE ORAL at 20:10

## 2025-08-03 RX ADMIN — NICOTINE POLACRILEX 2 MG: 2 LOZENGE ORAL at 16:21

## 2025-08-03 RX ADMIN — NICOTINE POLACRILEX 2 MG: 2 LOZENGE ORAL at 22:53

## 2025-08-03 RX ADMIN — NICOTINE POLACRILEX 2 MG: 2 LOZENGE ORAL at 17:26

## 2025-08-03 RX ADMIN — NICOTINE POLACRILEX 2 MG: 2 LOZENGE ORAL at 05:48

## 2025-08-04 PROCEDURE — 99233 SBSQ HOSP IP/OBS HIGH 50: CPT | Performed by: PSYCHIATRY & NEUROLOGY

## 2025-08-04 PROCEDURE — 1240000000 HC EMOTIONAL WELLNESS R&B

## 2025-08-04 PROCEDURE — 6370000000 HC RX 637 (ALT 250 FOR IP): Performed by: PSYCHIATRY & NEUROLOGY

## 2025-08-04 PROCEDURE — 6370000000 HC RX 637 (ALT 250 FOR IP): Performed by: NURSE PRACTITIONER

## 2025-08-04 RX ORDER — QUETIAPINE 200 MG/1
200 TABLET, FILM COATED, EXTENDED RELEASE ORAL NIGHTLY
Status: DISCONTINUED | OUTPATIENT
Start: 2025-08-04 | End: 2025-08-05

## 2025-08-04 RX ORDER — LORAZEPAM 2 MG/1
2 TABLET ORAL ONCE
Status: COMPLETED | OUTPATIENT
Start: 2025-08-04 | End: 2025-08-04

## 2025-08-04 RX ADMIN — NICOTINE POLACRILEX 2 MG: 2 LOZENGE ORAL at 20:56

## 2025-08-04 RX ADMIN — NICOTINE POLACRILEX 2 MG: 2 LOZENGE ORAL at 08:12

## 2025-08-04 RX ADMIN — NICOTINE POLACRILEX 2 MG: 2 LOZENGE ORAL at 10:27

## 2025-08-04 RX ADMIN — NICOTINE POLACRILEX 2 MG: 2 LOZENGE ORAL at 12:22

## 2025-08-04 RX ADMIN — NICOTINE POLACRILEX 2 MG: 2 LOZENGE ORAL at 01:18

## 2025-08-04 RX ADMIN — NICOTINE POLACRILEX 2 MG: 2 LOZENGE ORAL at 15:19

## 2025-08-04 RX ADMIN — NICOTINE POLACRILEX 2 MG: 2 LOZENGE ORAL at 19:18

## 2025-08-04 RX ADMIN — NICOTINE POLACRILEX 2 MG: 2 LOZENGE ORAL at 05:38

## 2025-08-04 RX ADMIN — NICOTINE POLACRILEX 2 MG: 2 LOZENGE ORAL at 09:10

## 2025-08-04 RX ADMIN — LORAZEPAM 2 MG: 2 TABLET ORAL at 11:35

## 2025-08-05 PROCEDURE — 99233 SBSQ HOSP IP/OBS HIGH 50: CPT | Performed by: PSYCHIATRY & NEUROLOGY

## 2025-08-05 PROCEDURE — 6370000000 HC RX 637 (ALT 250 FOR IP): Performed by: PSYCHIATRY & NEUROLOGY

## 2025-08-05 PROCEDURE — 6370000000 HC RX 637 (ALT 250 FOR IP): Performed by: NURSE PRACTITIONER

## 2025-08-05 PROCEDURE — 1240000000 HC EMOTIONAL WELLNESS R&B

## 2025-08-05 RX ORDER — QUETIAPINE 200 MG/1
200 TABLET, FILM COATED, EXTENDED RELEASE ORAL NIGHTLY
Status: DISCONTINUED | OUTPATIENT
Start: 2025-08-05 | End: 2025-08-07

## 2025-08-05 RX ORDER — ACETAMINOPHEN 325 MG/1
650 TABLET ORAL EVERY 8 HOURS PRN
Status: DISCONTINUED | OUTPATIENT
Start: 2025-08-05 | End: 2025-08-08 | Stop reason: HOSPADM

## 2025-08-05 RX ADMIN — NICOTINE POLACRILEX 2 MG: 2 LOZENGE ORAL at 06:45

## 2025-08-05 RX ADMIN — NICOTINE POLACRILEX 2 MG: 2 LOZENGE ORAL at 10:18

## 2025-08-05 RX ADMIN — NICOTINE POLACRILEX 2 MG: 2 LOZENGE ORAL at 02:45

## 2025-08-05 RX ADMIN — QUETIAPINE FUMARATE 200 MG: 200 TABLET, EXTENDED RELEASE ORAL at 13:10

## 2025-08-05 RX ADMIN — NICOTINE POLACRILEX 2 MG: 2 LOZENGE ORAL at 16:39

## 2025-08-05 RX ADMIN — NICOTINE POLACRILEX 2 MG: 2 LOZENGE ORAL at 15:14

## 2025-08-05 RX ADMIN — NICOTINE POLACRILEX 2 MG: 2 LOZENGE ORAL at 12:29

## 2025-08-05 RX ADMIN — NICOTINE POLACRILEX 2 MG: 2 LOZENGE ORAL at 17:59

## 2025-08-05 RX ADMIN — NICOTINE POLACRILEX 2 MG: 2 LOZENGE ORAL at 13:58

## 2025-08-05 RX ADMIN — NICOTINE POLACRILEX 2 MG: 2 LOZENGE ORAL at 08:58

## 2025-08-06 LAB
ALBUMIN SERPL-MCNC: 4.3 G/DL (ref 3.4–5)
ALBUMIN/GLOB SERPL: 1.5 {RATIO} (ref 1.1–2.2)
ALP SERPL-CCNC: 100 U/L (ref 40–129)
ALT SERPL-CCNC: 140 U/L (ref 10–40)
ANION GAP SERPL CALCULATED.3IONS-SCNC: 15 MMOL/L (ref 3–16)
AST SERPL-CCNC: 106 U/L (ref 15–37)
BILIRUB SERPL-MCNC: <0.2 MG/DL (ref 0–1)
BUN SERPL-MCNC: 16 MG/DL (ref 7–20)
CALCIUM SERPL-MCNC: 9.5 MG/DL (ref 8.3–10.6)
CHLORIDE SERPL-SCNC: 99 MMOL/L (ref 99–110)
CK SERPL-CCNC: 2504 U/L (ref 39–308)
CO2 SERPL-SCNC: 24 MMOL/L (ref 21–32)
CREAT SERPL-MCNC: 1 MG/DL (ref 0.9–1.3)
GFR SERPLBLD CREATININE-BSD FMLA CKD-EPI: >90 ML/MIN/{1.73_M2}
GLUCOSE SERPL-MCNC: 123 MG/DL (ref 70–99)
POTASSIUM SERPL-SCNC: 4.2 MMOL/L (ref 3.5–5.1)
PROT SERPL-MCNC: 7.1 G/DL (ref 6.4–8.2)
SODIUM SERPL-SCNC: 138 MMOL/L (ref 136–145)

## 2025-08-06 PROCEDURE — 99233 SBSQ HOSP IP/OBS HIGH 50: CPT | Performed by: PSYCHIATRY & NEUROLOGY

## 2025-08-06 PROCEDURE — 36415 COLL VENOUS BLD VENIPUNCTURE: CPT

## 2025-08-06 PROCEDURE — 80053 COMPREHEN METABOLIC PANEL: CPT

## 2025-08-06 PROCEDURE — 6370000000 HC RX 637 (ALT 250 FOR IP): Performed by: NURSE PRACTITIONER

## 2025-08-06 PROCEDURE — 82550 ASSAY OF CK (CPK): CPT

## 2025-08-06 PROCEDURE — 1240000000 HC EMOTIONAL WELLNESS R&B

## 2025-08-06 PROCEDURE — 6370000000 HC RX 637 (ALT 250 FOR IP): Performed by: PSYCHIATRY & NEUROLOGY

## 2025-08-06 RX ADMIN — NICOTINE POLACRILEX 2 MG: 2 LOZENGE ORAL at 07:44

## 2025-08-06 RX ADMIN — NICOTINE POLACRILEX 2 MG: 2 LOZENGE ORAL at 00:49

## 2025-08-06 RX ADMIN — NICOTINE POLACRILEX 2 MG: 2 LOZENGE ORAL at 18:32

## 2025-08-06 RX ADMIN — NICOTINE POLACRILEX 2 MG: 2 LOZENGE ORAL at 05:46

## 2025-08-06 RX ADMIN — NICOTINE POLACRILEX 2 MG: 2 LOZENGE ORAL at 01:40

## 2025-08-06 RX ADMIN — NICOTINE POLACRILEX 2 MG: 2 LOZENGE ORAL at 20:58

## 2025-08-06 RX ADMIN — NICOTINE POLACRILEX 2 MG: 2 LOZENGE ORAL at 11:15

## 2025-08-06 RX ADMIN — Medication: at 22:07

## 2025-08-06 RX ADMIN — NICOTINE POLACRILEX 2 MG: 2 LOZENGE ORAL at 03:34

## 2025-08-06 RX ADMIN — NICOTINE POLACRILEX 2 MG: 2 LOZENGE ORAL at 17:25

## 2025-08-06 RX ADMIN — QUETIAPINE FUMARATE 200 MG: 200 TABLET, EXTENDED RELEASE ORAL at 22:19

## 2025-08-06 RX ADMIN — NICOTINE POLACRILEX 2 MG: 2 LOZENGE ORAL at 13:08

## 2025-08-06 RX ADMIN — NICOTINE POLACRILEX 2 MG: 2 LOZENGE ORAL at 15:40

## 2025-08-06 RX ADMIN — NICOTINE POLACRILEX 2 MG: 2 LOZENGE ORAL at 12:24

## 2025-08-06 RX ADMIN — NICOTINE POLACRILEX 2 MG: 2 LOZENGE ORAL at 22:19

## 2025-08-06 RX ADMIN — NICOTINE POLACRILEX 2 MG: 2 LOZENGE ORAL at 08:59

## 2025-08-06 RX ADMIN — NICOTINE POLACRILEX 2 MG: 2 LOZENGE ORAL at 22:08

## 2025-08-07 PROCEDURE — 6370000000 HC RX 637 (ALT 250 FOR IP): Performed by: NURSE PRACTITIONER

## 2025-08-07 PROCEDURE — 1240000000 HC EMOTIONAL WELLNESS R&B

## 2025-08-07 PROCEDURE — 6370000000 HC RX 637 (ALT 250 FOR IP): Performed by: PSYCHIATRY & NEUROLOGY

## 2025-08-07 PROCEDURE — 99233 SBSQ HOSP IP/OBS HIGH 50: CPT | Performed by: PSYCHIATRY & NEUROLOGY

## 2025-08-07 RX ORDER — QUETIAPINE 150 MG/1
300 TABLET, FILM COATED, EXTENDED RELEASE ORAL NIGHTLY
Status: DISCONTINUED | OUTPATIENT
Start: 2025-08-07 | End: 2025-08-08 | Stop reason: HOSPADM

## 2025-08-07 RX ORDER — BENZOCAINE/MENTHOL 6 MG-10 MG
1 LOZENGE MUCOUS MEMBRANE 2 TIMES DAILY
Status: DISCONTINUED | OUTPATIENT
Start: 2025-08-07 | End: 2025-08-08 | Stop reason: HOSPADM

## 2025-08-07 RX ORDER — LORAZEPAM 1 MG/1
1 TABLET ORAL ONCE
Status: COMPLETED | OUTPATIENT
Start: 2025-08-07 | End: 2025-08-07

## 2025-08-07 RX ORDER — CETIRIZINE HYDROCHLORIDE 10 MG/1
10 TABLET ORAL ONCE
Status: COMPLETED | OUTPATIENT
Start: 2025-08-07 | End: 2025-08-07

## 2025-08-07 RX ADMIN — NICOTINE POLACRILEX 2 MG: 2 LOZENGE ORAL at 13:41

## 2025-08-07 RX ADMIN — HYDROCORTISONE CREAM 1% 1 G: 1 CREAM TOPICAL at 06:11

## 2025-08-07 RX ADMIN — NICOTINE POLACRILEX 2 MG: 2 LOZENGE ORAL at 20:09

## 2025-08-07 RX ADMIN — LORAZEPAM 1 MG: 1 TABLET ORAL at 15:32

## 2025-08-07 RX ADMIN — NICOTINE POLACRILEX 2 MG: 2 LOZENGE ORAL at 12:44

## 2025-08-07 RX ADMIN — NICOTINE POLACRILEX 2 MG: 2 LOZENGE ORAL at 06:17

## 2025-08-07 RX ADMIN — HYDROCORTISONE CREAM 1% 1 G: 1 CREAM TOPICAL at 20:37

## 2025-08-07 RX ADMIN — NICOTINE POLACRILEX 2 MG: 2 LOZENGE ORAL at 14:45

## 2025-08-07 RX ADMIN — CETIRIZINE HYDROCHLORIDE 10 MG: 10 TABLET ORAL at 10:55

## 2025-08-07 RX ADMIN — LORAZEPAM 1 MG: 1 TABLET ORAL at 12:42

## 2025-08-07 RX ADMIN — NICOTINE POLACRILEX 2 MG: 2 LOZENGE ORAL at 08:36

## 2025-08-08 VITALS
SYSTOLIC BLOOD PRESSURE: 141 MMHG | DIASTOLIC BLOOD PRESSURE: 82 MMHG | HEIGHT: 72 IN | OXYGEN SATURATION: 99 % | WEIGHT: 254.2 LBS | HEART RATE: 110 BPM | BODY MASS INDEX: 34.43 KG/M2 | RESPIRATION RATE: 16 BRPM | TEMPERATURE: 97.9 F

## 2025-08-08 PROBLEM — E87.1 HYPONATREMIA: Status: RESOLVED | Noted: 2025-08-02 | Resolved: 2025-08-08

## 2025-08-08 PROBLEM — E87.6 HYPOKALEMIA: Status: RESOLVED | Noted: 2025-08-08 | Resolved: 2025-08-08

## 2025-08-08 PROBLEM — E87.6 HYPOKALEMIA: Status: ACTIVE | Noted: 2025-08-08

## 2025-08-08 PROCEDURE — 6370000000 HC RX 637 (ALT 250 FOR IP): Performed by: NURSE PRACTITIONER

## 2025-08-08 PROCEDURE — 5130000000 HC BRIDGE APPOINTMENT

## 2025-08-08 RX ORDER — QUETIAPINE 300 MG/1
300 TABLET, FILM COATED, EXTENDED RELEASE ORAL NIGHTLY
Qty: 15 TABLET | Refills: 0 | Status: SHIPPED | OUTPATIENT
Start: 2025-08-08 | End: 2025-08-23

## 2025-08-08 RX ADMIN — HYDROCORTISONE CREAM 1% 1 G: 1 CREAM TOPICAL at 08:30

## 2025-08-08 RX ADMIN — NICOTINE POLACRILEX 2 MG: 2 LOZENGE ORAL at 08:29

## 2025-08-08 RX ADMIN — NICOTINE POLACRILEX 2 MG: 2 LOZENGE ORAL at 09:58

## 2025-08-08 RX ADMIN — NICOTINE POLACRILEX 2 MG: 2 LOZENGE ORAL at 06:37

## 2025-08-08 RX ADMIN — NICOTINE POLACRILEX 2 MG: 2 LOZENGE ORAL at 02:41

## 2025-08-11 ENCOUNTER — FOLLOWUP TELEPHONE ENCOUNTER (OUTPATIENT)
Dept: PSYCHIATRY | Age: 40
End: 2025-08-11

## 2025-08-12 ENCOUNTER — FOLLOWUP TELEPHONE ENCOUNTER (OUTPATIENT)
Dept: PSYCHIATRY | Age: 40
End: 2025-08-12

## 2025-08-13 ENCOUNTER — FOLLOWUP TELEPHONE ENCOUNTER (OUTPATIENT)
Dept: PSYCHIATRY | Age: 40
End: 2025-08-13